# Patient Record
Sex: FEMALE | Race: ASIAN | NOT HISPANIC OR LATINO
[De-identification: names, ages, dates, MRNs, and addresses within clinical notes are randomized per-mention and may not be internally consistent; named-entity substitution may affect disease eponyms.]

---

## 2022-02-12 PROBLEM — Z00.00 ENCOUNTER FOR PREVENTIVE HEALTH EXAMINATION: Status: ACTIVE | Noted: 2022-02-12

## 2022-02-15 ENCOUNTER — NON-APPOINTMENT (OUTPATIENT)
Age: 52
End: 2022-02-15

## 2022-02-16 ENCOUNTER — NON-APPOINTMENT (OUTPATIENT)
Age: 52
End: 2022-02-16

## 2022-02-16 ENCOUNTER — APPOINTMENT (OUTPATIENT)
Dept: GYNECOLOGIC ONCOLOGY | Facility: CLINIC | Age: 52
End: 2022-02-16
Payer: MEDICARE

## 2022-02-16 VITALS
DIASTOLIC BLOOD PRESSURE: 87 MMHG | SYSTOLIC BLOOD PRESSURE: 150 MMHG | BODY MASS INDEX: 25.91 KG/M2 | HEART RATE: 80 BPM | HEIGHT: 60 IN | WEIGHT: 132 LBS

## 2022-02-16 DIAGNOSIS — R19.00 INTRA-ABDOMINAL AND PELVIC SWELLING, MASS AND LUMP, UNSPECIFIED SITE: ICD-10-CM

## 2022-02-16 PROCEDURE — 99204 OFFICE O/P NEW MOD 45 MIN: CPT

## 2022-02-18 ENCOUNTER — NON-APPOINTMENT (OUTPATIENT)
Age: 52
End: 2022-02-18

## 2022-02-18 LAB
CANCER AG125 SERPL-ACNC: 36 U/ML
CANCER AG19-9 SERPL-ACNC: 16 U/ML
CEA SERPL-MCNC: 1.9 NG/ML

## 2022-02-22 ENCOUNTER — NON-APPOINTMENT (OUTPATIENT)
Age: 52
End: 2022-02-22

## 2022-02-22 ENCOUNTER — OUTPATIENT (OUTPATIENT)
Dept: OUTPATIENT SERVICES | Facility: HOSPITAL | Age: 52
LOS: 1 days | End: 2022-02-22

## 2022-02-22 VITALS
DIASTOLIC BLOOD PRESSURE: 84 MMHG | HEART RATE: 85 BPM | OXYGEN SATURATION: 99 % | RESPIRATION RATE: 16 BRPM | HEIGHT: 61 IN | TEMPERATURE: 98 F | WEIGHT: 130.07 LBS | SYSTOLIC BLOOD PRESSURE: 130 MMHG

## 2022-02-22 DIAGNOSIS — R19.00 INTRA-ABDOMINAL AND PELVIC SWELLING, MASS AND LUMP, UNSPECIFIED SITE: ICD-10-CM

## 2022-02-22 DIAGNOSIS — Z98.890 OTHER SPECIFIED POSTPROCEDURAL STATES: Chronic | ICD-10-CM

## 2022-02-22 LAB
A1C WITH ESTIMATED AVERAGE GLUCOSE RESULT: 5.8 % — HIGH (ref 4–5.6)
ANION GAP SERPL CALC-SCNC: 10 MMOL/L — SIGNIFICANT CHANGE UP (ref 7–14)
BLD GP AB SCN SERPL QL: NEGATIVE — SIGNIFICANT CHANGE UP
BUN SERPL-MCNC: 12 MG/DL — SIGNIFICANT CHANGE UP (ref 7–23)
CALCIUM SERPL-MCNC: 8.4 MG/DL — SIGNIFICANT CHANGE UP (ref 8.4–10.5)
CHLORIDE SERPL-SCNC: 106 MMOL/L — SIGNIFICANT CHANGE UP (ref 98–107)
CO2 SERPL-SCNC: 21 MMOL/L — LOW (ref 22–31)
CREAT SERPL-MCNC: 0.67 MG/DL — SIGNIFICANT CHANGE UP (ref 0.5–1.3)
ESTIMATED AVERAGE GLUCOSE: 120 — SIGNIFICANT CHANGE UP
GLUCOSE SERPL-MCNC: 137 MG/DL — HIGH (ref 70–99)
HCG SERPL-ACNC: <5 MIU/ML — SIGNIFICANT CHANGE UP
HCT VFR BLD CALC: 39.3 % — SIGNIFICANT CHANGE UP (ref 34.5–45)
HGB BLD-MCNC: 13.3 G/DL — SIGNIFICANT CHANGE UP (ref 11.5–15.5)
MCHC RBC-ENTMCNC: 31.1 PG — SIGNIFICANT CHANGE UP (ref 27–34)
MCHC RBC-ENTMCNC: 33.8 GM/DL — SIGNIFICANT CHANGE UP (ref 32–36)
MCV RBC AUTO: 92 FL — SIGNIFICANT CHANGE UP (ref 80–100)
NRBC # BLD: 0 /100 WBCS — SIGNIFICANT CHANGE UP
NRBC # FLD: 0 K/UL — SIGNIFICANT CHANGE UP
PLATELET # BLD AUTO: 257 K/UL — SIGNIFICANT CHANGE UP (ref 150–400)
POTASSIUM SERPL-MCNC: 3.8 MMOL/L — SIGNIFICANT CHANGE UP (ref 3.5–5.3)
POTASSIUM SERPL-SCNC: 3.8 MMOL/L — SIGNIFICANT CHANGE UP (ref 3.5–5.3)
RBC # BLD: 4.27 M/UL — SIGNIFICANT CHANGE UP (ref 3.8–5.2)
RBC # FLD: 12.8 % — SIGNIFICANT CHANGE UP (ref 10.3–14.5)
RH IG SCN BLD-IMP: POSITIVE — SIGNIFICANT CHANGE UP
SODIUM SERPL-SCNC: 137 MMOL/L — SIGNIFICANT CHANGE UP (ref 135–145)
WBC # BLD: 4.91 K/UL — SIGNIFICANT CHANGE UP (ref 3.8–10.5)
WBC # FLD AUTO: 4.91 K/UL — SIGNIFICANT CHANGE UP (ref 3.8–10.5)

## 2022-02-22 RX ORDER — SODIUM CHLORIDE 9 MG/ML
1000 INJECTION, SOLUTION INTRAVENOUS
Refills: 0 | Status: DISCONTINUED | OUTPATIENT
Start: 2022-03-01 | End: 2022-03-01

## 2022-02-22 RX ORDER — CHLORHEXIDINE GLUCONATE 213 G/1000ML
1 SOLUTION TOPICAL ONCE
Refills: 0 | Status: COMPLETED | OUTPATIENT
Start: 2022-03-01 | End: 2022-03-01

## 2022-02-22 NOTE — H&P PST ADULT - GENITOURINARY COMMENTS
pelvic bloating and swelling preop dx. intra-abdominal and pelvic swelling and lump unspecified site

## 2022-02-22 NOTE — H&P PST ADULT - NSANTHOSAYNRD_GEN_A_CORE
No. DHARA screening performed.  STOP BANG Legend: 0-2 = LOW Risk; 3-4 = INTERMEDIATE Risk; 5-8 = HIGH Risk

## 2022-02-22 NOTE — H&P PST ADULT - PROBLEM SELECTOR PLAN 1
Pt is scheduled for exploratory laparotomy, resection of pelvic mass, possible total abdominal hysteroscopy, bilateral salpingo oophorectomy on 3/1/22.  Verbal and written pre op instructions reviewed with patient and pt able to verbalize understanding.   Verbal and written instructions given with chlorhexidine wash, pt able to verbalize understanding via teach back method.  Pt instructed to follow surgeon's guidelines regarding COVID testing preop.  Sterile cup given, pt instructed to bring urine sample AM of surgery for UCG and pt able to verbalize understanding.

## 2022-02-22 NOTE — H&P PST ADULT - HISTORY OF PRESENT ILLNESS
52 yo female with preop dx intra-abdominal and pelvic swelling and lump unspecified site presents to pre surgical testing.  Pt with c/o abdominal swelling and bloating, s/p US revealing pelvic mass, further evaluated with CT.  Pt is scheduled for exploratory laparotomy, resetion of pelvic mass, possible total abdominal hysteroscopy, bilateral salpingo oophorectomy.   52 yo female with preop dx intra-abdominal and pelvic swelling and lump unspecified site presents to pre surgical testing.  Pt with c/o abdominal swelling and bloating, s/p US revealing pelvic mass, further evaluated with CT.  Pt is scheduled for exploratory laparotomy, resection of pelvic mass, possible total abdominal hysteroscopy, bilateral salpingo oophorectomy.

## 2022-02-22 NOTE — H&P PST ADULT - NEGATIVE NEUROLOGICAL SYMPTOMS
no transient paralysis/no weakness/no paresthesias/no generalized seizures/no vertigo/no loss of sensation/no difficulty walking

## 2022-02-22 NOTE — H&P PST ADULT - NSICDXFAMHXNEG_GEN_ALL
Child's Well Visit, 1 Week: Care Instructions Your Care Instructions You may wonder \"Am I doing this right? \" Trust your instincts. Cuddling, rocking, and talking to your baby are the right things to do. At this age, your new baby may respond to sounds by blinking, crying, or appearing to be startled. He or she may look at faces and follow an object with his or her eyes. Your baby may be moving his or her arms, legs, and head. Your next checkup is when your baby is 3to 2 weeks old. Follow-up care is a key part of your child's treatment and safety. Be sure to make and go to all appointments, and call your doctor if your child is having problems. It's also a good idea to know your child's test results and keep a list of the medicines your child takes. How can you care for your child at home? Feeding · Feed your baby whenever he or she is hungry. In the first 2 weeks, your baby will breastfeed at least 8 times in a 24-hour period. This means you may need to wake your baby to breastfeed. · If you do not breastfeed, use a formula with iron. (Talk to your doctor if you are using a low-iron formula.) At this age, most babies feed about 1½ to 3 ounces of formula every 3 to 4 hours. · Do not warm bottles in the microwave. You could burn your baby's mouth. Always check the temperature of the formula by placing a few drops on your wrist. 
· Never give your baby honey in the first year of life. Honey can make your baby sick. Breastfeeding tips · Offer the other breast when the first breast feels empty and your baby sucks more slowly, pulls off, or loses interest. Usually your baby will continue breastfeeding, though perhaps for less time than on the first breast. If your baby takes only one breast at a feeding, start the next feeding on the other breast. 
· If your baby is sleepy when it is time to eat, try changing your baby's diaper, undressing your baby and taking your shirt off for skin-to-skin contact, or gently rubbing your fingers up and down your baby's back. · If your baby cannot latch on to your breast, try this: 
? Hold your baby's body facing your body (chest to chest). ? Support your breast with your fingers under your breast and your thumb on top. Keep your fingers and thumb off of the areola. ? Use your nipple to lightly tickle your baby's lower lip. When your baby opens his or her mouth wide, quickly pull your baby onto your breast. 
? Get as much of your breast into your baby's mouth as you can. 
? Call your doctor if you have problems. · By the third day of life, you should notice some breast fullness and milk dripping from the other breast while you nurse. · By the third day of life, your baby should be latching on to the breast well, having at least 3 stools a day, and wetting at least 6 diapers a day. Stools should be yellow and watery, not dark green and sticky. Healthy habits · Stay healthy yourself by eating healthy foods and drinking plenty of fluids, especially water. Rest when your baby is sleeping. · Do not smoke or expose your baby to smoke. Smoking increases the risk of SIDS (crib death), ear infections, asthma, colds, and pneumonia. If you need help quitting, talk to your doctor about stop-smoking programs and medicines. These can increase your chances of quitting for good. · Wash your hands before you hold your baby. Keep your baby away from crowds and sick people. Be sure all visitors are up to date with their vaccinations. · Try to keep the umbilical cord dry until it falls off. · Keep babies younger than 6 months out of the sun. If you cannot avoid the sun, use hats and clothing to protect your child's skin. Safety · Put your baby to sleep on his or her back, not on the side or tummy. This reduces the risk of SIDS. Use a firm, flat mattress. Do not put pillows in the crib. Do not use sleep positioners or crib bumpers. · Put your baby in a car seat for every ride. Place the seat in the middle of the backseat, facing backward. For questions about car seats, call the [employer] at 1-954.119.6539. Parenting · Never shake or spank your baby. This can cause serious injury and even death. · Many women get the \"baby blues\" during the first few days after childbirth. Ask for help with preparing food and other daily tasks. Family and friends are often happy to help a new mother. · If your moodiness or anxiety lasts for more than 2 weeks, or if you feel like life is not worth living, you may have postpartum depression. Talk to your doctor. · Dress your baby with one more layer of clothing than you are wearing, including a hat during the winter. Cold air or wind does not cause ear infections or pneumonia. Illness and fever · Hiccups, sneezing, irregular breathing, sounding congested, and crossing of the eyes are all normal. 
· Call your doctor if your baby has signs of jaundice, such as yellow- or orange-colored skin. · Take your baby's rectal temperature if you think he or she is ill. It is the most accurate. Armpit and ear temperatures are not as reliable at this age. ? A normal rectal temperature is from 97.5°F to 100.3°F. 
? [place] your baby down on his or her stomach. Put some petroleum jelly on the end of the thermometer and gently put the thermometer about ¼ to ½ inch into the rectum. Leave it in for 2 minutes. To read the thermometer, turn it so you can see the display clearly. When should you call for help? Watch closely for changes in your baby's health, and be sure to contact your doctor if: 
  · You are concerned that your baby is not getting enough to eat or is not developing normally.  
  · Your baby seems sick.  
  · Your baby has a fever.  
  · You need more information about how to care for your baby, or you have questions or concerns. Where can you learn more? Go to http://www.gray.com/ Enter Y254 in the search box to learn more about \"Child's Well Visit, 1 Week: Care Instructions. \" Current as of: May 27, 2020               Content Version: 12.6 © 4948-8437 "[employer]orporated. Care instructions adapted under license by [employer] (which disclaims liability or warranty for this information). If you have questions about a medical condition or this instruction, always ask your healthcare professional. [place] [address] any warranty or liability for your use of this information. Always back to sleep and may do tummy time 2-3+ times/day when awake Reviewed that for temps over 100.4 F rectally to call immediately No tylenol until after 3 mo of age cancer/coronary disease/stroke

## 2022-02-28 ENCOUNTER — TRANSCRIPTION ENCOUNTER (OUTPATIENT)
Age: 52
End: 2022-02-28

## 2022-02-28 NOTE — ASU PATIENT PROFILE, ADULT - FALL HARM RISK - UNIVERSAL INTERVENTIONS
Bed in lowest position, wheels locked, appropriate side rails in place/Call bell, personal items and telephone in reach/Instruct patient to call for assistance before getting out of bed or chair/Non-slip footwear when patient is out of bed/[place] to call system/Physically safe environment - no spills, clutter or unnecessary equipment/Purposeful Proactive Rounding/Room/bathroom lighting operational, light cord in reach

## 2022-03-01 ENCOUNTER — INPATIENT (INPATIENT)
Facility: HOSPITAL | Age: 52
LOS: 2 days | Discharge: HOME | End: 2022-03-04
Attending: OBSTETRICS & GYNECOLOGY | Admitting: OBSTETRICS & GYNECOLOGY
Payer: MEDICAID

## 2022-03-01 ENCOUNTER — RESULT REVIEW (OUTPATIENT)
Age: 52
End: 2022-03-01

## 2022-03-01 ENCOUNTER — TRANSCRIPTION ENCOUNTER (OUTPATIENT)
Age: 52
End: 2022-03-01

## 2022-03-01 ENCOUNTER — APPOINTMENT (OUTPATIENT)
Dept: GYNECOLOGIC ONCOLOGY | Facility: HOSPITAL | Age: 52
End: 2022-03-01

## 2022-03-01 VITALS
OXYGEN SATURATION: 99 % | TEMPERATURE: 98 F | DIASTOLIC BLOOD PRESSURE: 87 MMHG | HEIGHT: 61 IN | HEART RATE: 93 BPM | WEIGHT: 125 LBS | RESPIRATION RATE: 16 BRPM | SYSTOLIC BLOOD PRESSURE: 145 MMHG

## 2022-03-01 DIAGNOSIS — R19.00 INTRA-ABDOMINAL AND PELVIC SWELLING, MASS AND LUMP, UNSPECIFIED SITE: ICD-10-CM

## 2022-03-01 DIAGNOSIS — Z98.890 OTHER SPECIFIED POSTPROCEDURAL STATES: Chronic | ICD-10-CM

## 2022-03-01 LAB
GLUCOSE BLDC GLUCOMTR-MCNC: 96 MG/DL — SIGNIFICANT CHANGE UP (ref 70–99)
HCG UR QL: NEGATIVE — SIGNIFICANT CHANGE UP
RH IG SCN BLD-IMP: POSITIVE — SIGNIFICANT CHANGE UP

## 2022-03-01 PROCEDURE — 88305 TISSUE EXAM BY PATHOLOGIST: CPT | Mod: 26

## 2022-03-01 PROCEDURE — 88307 TISSUE EXAM BY PATHOLOGIST: CPT | Mod: 26

## 2022-03-01 PROCEDURE — 49205: CPT | Mod: 62

## 2022-03-01 PROCEDURE — 49205: CPT

## 2022-03-01 PROCEDURE — 88331 PATH CONSLTJ SURG 1 BLK 1SPC: CPT | Mod: 26

## 2022-03-01 PROCEDURE — 50715 RELEASE OF URETER: CPT | Mod: 59

## 2022-03-01 PROCEDURE — 50225 REMOVAL KIDNEY OPEN COMPLEX: CPT | Mod: RT

## 2022-03-01 PROCEDURE — 88291 CYTO/MOLECULAR REPORT: CPT | Mod: 1L

## 2022-03-01 PROCEDURE — 88112 CYTOPATH CELL ENHANCE TECH: CPT | Mod: 26

## 2022-03-01 DEVICE — VISTASEAL FIBRIN HUMAN 10ML: Type: IMPLANTABLE DEVICE | Status: FUNCTIONAL

## 2022-03-01 DEVICE — STAPLER COVIDIEN TRI-STAPLE 60MM TAN RELOAD: Type: IMPLANTABLE DEVICE | Status: FUNCTIONAL

## 2022-03-01 DEVICE — SEPRAFILM 5 X 6": Type: IMPLANTABLE DEVICE | Status: FUNCTIONAL

## 2022-03-01 DEVICE — STAPLER COVIDIEN TRI-STAPLE 45MM PURPLE RELOAD: Type: IMPLANTABLE DEVICE | Status: FUNCTIONAL

## 2022-03-01 RX ORDER — ACETAMINOPHEN 500 MG
1000 TABLET ORAL ONCE
Refills: 0 | Status: COMPLETED | OUTPATIENT
Start: 2022-03-01 | End: 2022-03-01

## 2022-03-01 RX ORDER — SODIUM CHLORIDE 9 MG/ML
1000 INJECTION, SOLUTION INTRAVENOUS
Refills: 0 | Status: DISCONTINUED | OUTPATIENT
Start: 2022-03-01 | End: 2022-03-02

## 2022-03-01 RX ORDER — NALOXONE HYDROCHLORIDE 4 MG/.1ML
0.1 SPRAY NASAL
Refills: 0 | Status: DISCONTINUED | OUTPATIENT
Start: 2022-03-01 | End: 2022-03-02

## 2022-03-01 RX ORDER — SIMETHICONE 80 MG/1
80 TABLET, CHEWABLE ORAL EVERY 6 HOURS
Refills: 0 | Status: DISCONTINUED | OUTPATIENT
Start: 2022-03-01 | End: 2022-03-04

## 2022-03-01 RX ORDER — ONDANSETRON 8 MG/1
4 TABLET, FILM COATED ORAL EVERY 6 HOURS
Refills: 0 | Status: DISCONTINUED | OUTPATIENT
Start: 2022-03-01 | End: 2022-03-04

## 2022-03-01 RX ORDER — FENTANYL CITRATE 50 UG/ML
25 INJECTION INTRAVENOUS
Refills: 0 | Status: DISCONTINUED | OUTPATIENT
Start: 2022-03-01 | End: 2022-03-02

## 2022-03-01 RX ORDER — ACETAMINOPHEN 500 MG
1000 TABLET ORAL ONCE
Refills: 0 | Status: COMPLETED | OUTPATIENT
Start: 2022-03-02 | End: 2022-03-02

## 2022-03-01 RX ORDER — SENNA PLUS 8.6 MG/1
2 TABLET ORAL AT BEDTIME
Refills: 0 | Status: DISCONTINUED | OUTPATIENT
Start: 2022-03-01 | End: 2022-03-04

## 2022-03-01 RX ORDER — ONDANSETRON 8 MG/1
4 TABLET, FILM COATED ORAL ONCE
Refills: 0 | Status: DISCONTINUED | OUTPATIENT
Start: 2022-03-01 | End: 2022-03-02

## 2022-03-01 RX ORDER — HYDROMORPHONE HYDROCHLORIDE 2 MG/ML
30 INJECTION INTRAMUSCULAR; INTRAVENOUS; SUBCUTANEOUS
Refills: 0 | Status: DISCONTINUED | OUTPATIENT
Start: 2022-03-01 | End: 2022-03-02

## 2022-03-01 RX ORDER — HEPARIN SODIUM 5000 [USP'U]/ML
5000 INJECTION INTRAVENOUS; SUBCUTANEOUS EVERY 8 HOURS
Refills: 0 | Status: DISCONTINUED | OUTPATIENT
Start: 2022-03-02 | End: 2022-03-04

## 2022-03-01 RX ORDER — ONDANSETRON 8 MG/1
4 TABLET, FILM COATED ORAL EVERY 6 HOURS
Refills: 0 | Status: DISCONTINUED | OUTPATIENT
Start: 2022-03-01 | End: 2022-03-02

## 2022-03-01 RX ORDER — HYDROMORPHONE HYDROCHLORIDE 2 MG/ML
0.5 INJECTION INTRAMUSCULAR; INTRAVENOUS; SUBCUTANEOUS
Refills: 0 | Status: DISCONTINUED | OUTPATIENT
Start: 2022-03-01 | End: 2022-03-02

## 2022-03-01 RX ORDER — PANTOPRAZOLE SODIUM 20 MG/1
40 TABLET, DELAYED RELEASE ORAL DAILY
Refills: 0 | Status: DISCONTINUED | OUTPATIENT
Start: 2022-03-01 | End: 2022-03-04

## 2022-03-01 RX ORDER — KETOROLAC TROMETHAMINE 30 MG/ML
15 SYRINGE (ML) INJECTION EVERY 6 HOURS
Refills: 0 | Status: DISCONTINUED | OUTPATIENT
Start: 2022-03-01 | End: 2022-03-02

## 2022-03-01 RX ADMIN — CHLORHEXIDINE GLUCONATE 1 APPLICATION(S): 213 SOLUTION TOPICAL at 14:18

## 2022-03-01 RX ADMIN — Medication 15 MILLIGRAM(S): at 23:35

## 2022-03-01 RX ADMIN — HYDROMORPHONE HYDROCHLORIDE 30 MILLILITER(S): 2 INJECTION INTRAMUSCULAR; INTRAVENOUS; SUBCUTANEOUS at 23:49

## 2022-03-01 RX ADMIN — Medication 15 MILLIGRAM(S): at 23:05

## 2022-03-01 RX ADMIN — SODIUM CHLORIDE 80 MILLILITER(S): 9 INJECTION, SOLUTION INTRAVENOUS at 21:31

## 2022-03-01 RX ADMIN — SODIUM CHLORIDE 30 MILLILITER(S): 9 INJECTION, SOLUTION INTRAVENOUS at 14:17

## 2022-03-01 NOTE — DISCHARGE NOTE PROVIDER - PROVIDER TOKENS
PROVIDER:[TOKEN:[[id number]]] PROVIDER:[TOKEN:[[id number]],SCHEDULEDAPPT:[03/14/2022],SCHEDULEDAPPTTIME:[02:30 PM]] PROVIDER:[TOKEN:[[id number]],SCHEDULEDAPPT:[03/14/2022],SCHEDULEDAPPTTIME:[02:30 PM]],PROVIDER:[TOKEN:[[id number]]]

## 2022-03-01 NOTE — BRIEF OPERATIVE NOTE - OPERATION/FINDINGS
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

## 2022-03-01 NOTE — DISCHARGE NOTE PROVIDER - NSDCMRMEDTOKEN_GEN_ALL_CORE_FT
no medications:    apixaban 2.5 mg oral tablet: 1 tab(s) orally every 12 hours MDD:2 tabs  please apply coupon  provider contact #[id number]  no medications:    acetaminophen 325 mg oral tablet: 3 tab(s) orally every 6 hours  apixaban 2.5 mg oral tablet: 1 tab(s) orally every 12 hours MDD:2 tabs  please apply coupon  provider contact #[id number]  naproxen 500 mg oral tablet: 1 tab(s) orally every 12 hours MDD:2 tabs  oxyCODONE 5 mg oral tablet: 1 tab(s) orally every 6 hours - for severe pain MDD:4 tabs

## 2022-03-01 NOTE — DISCHARGE NOTE PROVIDER - CARE PROVIDERS DIRECT ADDRESSES
,[email or web address]

## 2022-03-01 NOTE — BRIEF OPERATIVE NOTE - NSICDXBRIEFPROCEDURE_GEN_ALL_CORE_FT
PROCEDURES:  Exploratory laparotomy 01-Mar-2022 21:13:02  Katy Mejia  Right total nephrectomy 01-Mar-2022 21:14:31  Katy Mejia  Exploration, retroperitoneum 01-Mar-2022 21:17:24  Katy Mejia

## 2022-03-01 NOTE — CHART NOTE - NSCHARTNOTEFT_GEN_A_CORE
OBGYN POST-OP CHECK    S: Patient seen and evaluated at bedside.  Pt sleeping, easily aroused. Patient reports feeling tired and minimal incisional pain. Pt denies N/V, SOB, CP, palpitations, fever/chills. Has not yet tried anything by mouth.  Not OOB yet. Martinez in place.    O:   T(C): 36.1 (03-01-22 @ 21:05), Max: 36.1 (03-01-22 @ 21:05)  HR: 80 (03-01-22 @ 23:00) (68 - 80)  BP: 124/68 (03-01-22 @ 22:45) (124/68 - 149/74)  RR: 15 (03-01-22 @ 23:00) (13 - 20)  SpO2: 96% (03-01-22 @ 23:00) (96% - 100%)  Wt(kg): --  I&O's Summary    01 Mar 2022 07:01  -  01 Mar 2022 23:42  --------------------------------------------------------  IN: 240 mL / OUT: 250 mL / NET: -10 mL        Gen: Resting comfortably in bed, NAD  CV: S1+/S2+, RRR  Lungs: CTA B/L  Abd: soft, appropriately tender, hypoactive bs    Inc: midline vertical incision C/D/I with dermabond prineo over top   Ext: SCD's in place and functional, non-tender b/l, no edema        A/P: 51y Female s/p Ex Lap, Retroperitoneal Dissection, Resection of Pelvic Mass, R nephrectomy.     Neuro: s/p QL blocks. PCA, IV tylenol, toradol for pain control  CV: Hemodynamically stable.  Monitor VS. CBC in AM.   Pulm: Saturating well on room air.  Encourage OOB and incentive spirometer use. Continuous pulse ox while on PCA.  GI: CLD as tolerated. Anti-emetics PRN. Protonix, Senna, Simethicone.  : Martinez to gravity. UOP adequate.  FEN: LR@125cc/hr. BMP in AM.   Heme: DVT prophylaxis w/ SCD's while in bed. Early ambulation. Continue Heparin SQ.   ID: Afebrile  Endo: No active issues   Dispo: Discharge from PACU when criteria met.     Vianey Velasquez, PGY4

## 2022-03-01 NOTE — DISCHARGE NOTE PROVIDER - REASON FOR ADMISSION
scheduled surgery exploratory laparotomy, retroperitoneal dissection,  exploratory laparotomy, retroperitoneal dissection, resection of pelvic mass, right nephrectomy

## 2022-03-01 NOTE — DISCHARGE NOTE PROVIDER - CARE PROVIDER_API CALL
Almaz De Jesus)  Gynecologic Oncology; Obstetrics and Gynecology  [address] [place] [address]  Phone: [phone]  Fax: [phone]  Follow Up Time:    Almaz De Jesus)  Gynecologic Oncology; Obstetrics and Gynecology  [address] [place] [address]  Phone: [phone]  Fax: [phone]  Scheduled Appointment: 03/14/2022 02:30 PM   Almaz De Jesus)  Gynecologic Oncology; Obstetrics and Gynecology  [address] [place] [address]  Phone: [phone]  Fax: [phone]  Scheduled Appointment: 03/14/2022 02:30 PM    Monica Lennon)  Urology  [address] [place] [address]  Phone: [phone]  Fax: [phone]  Follow Up Time:

## 2022-03-01 NOTE — DISCHARGE NOTE PROVIDER - NSDCFUADDINST_GEN_ALL_CORE_FT
Regular activity as tolerated, no heavy lifting for 6 weeks.  Nothing per vagina: no intercourse, tampons or douching for 6 weeks or until cleared by your physician.  Call your provider if you experience fevers (100.4F), chills, nausea, vomiting, persistent swelling of incision, worsening abdominal pain, inability to urinate or worsening vaginal bleeding.  Follow up with your provider in 2 weeks. Regular activity as tolerated, no heavy lifting for 6 weeks.  Nothing per vagina: no intercourse, tampons or douching for 6 weeks or until cleared by your physician.  Call your provider if you experience fevers (100.4F), chills, nausea, vomiting, persistent swelling of incision, worsening abdominal pain, inability to urinate or worsening vaginal bleeding.  Take Tylenol and Naproxen as needed for pain. Take Oxycodone as needed for severe pain. A prescription for Naproxen and Oxycodone has been sent to your pharmacy on file. Follow up with your provider on 3/14/22 at 2:30pm, as scheduled.

## 2022-03-01 NOTE — DISCHARGE NOTE PROVIDER - NSDCCPTREATMENT_GEN_ALL_CORE_FT
PRINCIPAL PROCEDURE  Procedure: Exploratory laparotomy  Findings and Treatment:       SECONDARY PROCEDURE  Procedure: Exploration, retroperitoneum  Findings and Treatment:     Procedure: Right total nephrectomy  Findings and Treatment:

## 2022-03-01 NOTE — DISCHARGE NOTE PROVIDER - HOSPITAL COURSE
Patient underwent an exploratory laparotomy, resection of pelvic mass, left nephrectomy. 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  EBL:   . Hct:   .    On POD#0, pain was well controlled with PCA pump and patient tolerated clears.   On POD#1,   Upon discharge on POD# , the patient was ambulating, voiding spontaneously, tolerating oral intake, pain was well controlled with oral medication, and vital signs were stable. Patient to have close follow up with Dr. De Jesus in the office. Patient underwent an exploratory laparotomy, resection of pelvic mass, left nephrectomy. 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  EBL: 200    Hct: 39.3-> 35.2    On POD#0, pain was well controlled with PCA pump and patient tolerated clears.   On POD#1, patient tolerated regular diet and Martinez was removed. Patient is voiding spontaneously ?????  Upon discharge on POD# , the patient was ambulating, voiding spontaneously, tolerating oral intake, pain was well controlled with oral medication, and vital signs were stable. Patient to have close follow up with Dr. De Jesus in the office. Patient underwent an exploratory laparotomy, resection of retroperitoneal mass and right nephrectomy with Yanely Telles and Saji as intraoperative consults. 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 EBL: 200. Please see operative note for details of procedure.  Hct: 39.3-> 35.2    On POD#0, pain was well controlled with PCA pump and patient tolerated clears.   On POD#1, patient tolerated regular diet and Martinez was removed. Patient was able to void freely. She was transitioned to oral anaglesics. An uptrend in Cr was noted from 0.67->1.14.  On POD#2, patient continued to progress appropriately in postoperative period. Cr was noted to further uptrend to 1.26.   Upon discharge on POD# , the patient was ambulating, voiding spontaneously, tolerating oral intake, pain was well controlled with oral medication, and vital signs were stable. Patient to have close follow up with Dr. De Jesus in the office. Patient underwent an exploratory laparotomy, resection of retroperitoneal mass and right nephrectomy with Yanely Telles and Saji as intraoperative consults. 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 EBL: 200. Please see operative note for details of procedure.  Hct: 39.3-> 35.2    On POD#0, pain was well controlled with PCA pump and patient tolerated clears.   On POD#1, patient tolerated regular diet and Martinez was removed. Patient was able to void freely. She was transitioned to oral anaglesics. An uptrend in Cr was noted from 0.67->1.14.  On POD#2, patient continued to progress appropriately in postoperative period. Cr was noted to further uptrend to 1.26.   On POD#4, Cr improved to 1.12. All AM labs were stable, and patient was asymptomatic.   Upon discharge on POD# , the patient was ambulating, voiding spontaneously, tolerating oral intake, pain was well controlled with oral medication, and vital signs were stable. Patient to have close follow up with Dr. De Jesus in the office. Patient underwent an exploratory laparotomy, resection of retroperitoneal mass and right nephrectomy with Yanely Telles and Saji as intraoperative consults. 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 EBL: 200. Please see operative note for details of procedure.  Hct: 39.3-> 35.2 ->31.4 -> 30.7     On POD#0, pain was well controlled with PCA pump and patient tolerated clears.   On POD#1, patient tolerated regular diet and Martinez was removed. Patient was able to void freely. She was transitioned to oral anaglesics. An uptrend in Cr was noted from 0.67->1.14.  On POD#2, patient continued to progress appropriately in postoperative period. Cr was noted to further uptrend to 1.26.   On POD#4, Cr improved to 1.12. All AM labs were stable, and patient was asymptomatic.   Upon discharge on POD# 4 , the patient was ambulating, voiding spontaneously, tolerating oral intake, pain was well controlled with oral medication, and vital signs were stable. Patient sent home with Apixaban. Patient to have close follow up with Dr. De Jesus and Dr. Lennon of Urology.

## 2022-03-02 PROBLEM — R19.00 INTRA-ABDOMINAL AND PELVIC SWELLING, MASS AND LUMP, UNSPECIFIED SITE: Chronic | Status: ACTIVE | Noted: 2022-02-22

## 2022-03-02 LAB
ANION GAP SERPL CALC-SCNC: 11 MMOL/L — SIGNIFICANT CHANGE UP (ref 7–14)
BASOPHILS # BLD AUTO: 0.01 K/UL — SIGNIFICANT CHANGE UP (ref 0–0.2)
BASOPHILS NFR BLD AUTO: 0.1 % — SIGNIFICANT CHANGE UP (ref 0–2)
BUN SERPL-MCNC: 15 MG/DL — SIGNIFICANT CHANGE UP (ref 7–23)
CALCIUM SERPL-MCNC: 7.8 MG/DL — LOW (ref 8.4–10.5)
CHLORIDE SERPL-SCNC: 101 MMOL/L — SIGNIFICANT CHANGE UP (ref 98–107)
CO2 SERPL-SCNC: 21 MMOL/L — LOW (ref 22–31)
CREAT SERPL-MCNC: 1.14 MG/DL — SIGNIFICANT CHANGE UP (ref 0.5–1.3)
EGFR: 59 ML/MIN/1.73M2 — LOW
EOSINOPHIL # BLD AUTO: 0 K/UL — SIGNIFICANT CHANGE UP (ref 0–0.5)
EOSINOPHIL NFR BLD AUTO: 0 % — SIGNIFICANT CHANGE UP (ref 0–6)
GLUCOSE SERPL-MCNC: 194 MG/DL — HIGH (ref 70–99)
HCT VFR BLD CALC: 35.2 % — SIGNIFICANT CHANGE UP (ref 34.5–45)
HGB BLD-MCNC: 11.5 G/DL — SIGNIFICANT CHANGE UP (ref 11.5–15.5)
IANC: 7.8 K/UL — SIGNIFICANT CHANGE UP (ref 1.5–8.5)
IMM GRANULOCYTES NFR BLD AUTO: 0.5 % — SIGNIFICANT CHANGE UP (ref 0–1.5)
LYMPHOCYTES # BLD AUTO: 0.61 K/UL — LOW (ref 1–3.3)
LYMPHOCYTES # BLD AUTO: 6.9 % — LOW (ref 13–44)
MAGNESIUM SERPL-MCNC: 1.6 MG/DL — SIGNIFICANT CHANGE UP (ref 1.6–2.6)
MCHC RBC-ENTMCNC: 30.5 PG — SIGNIFICANT CHANGE UP (ref 27–34)
MCHC RBC-ENTMCNC: 32.7 GM/DL — SIGNIFICANT CHANGE UP (ref 32–36)
MCV RBC AUTO: 93.4 FL — SIGNIFICANT CHANGE UP (ref 80–100)
MONOCYTES # BLD AUTO: 0.35 K/UL — SIGNIFICANT CHANGE UP (ref 0–0.9)
MONOCYTES NFR BLD AUTO: 4 % — SIGNIFICANT CHANGE UP (ref 2–14)
NEUTROPHILS # BLD AUTO: 7.8 K/UL — HIGH (ref 1.8–7.4)
NEUTROPHILS NFR BLD AUTO: 88.5 % — HIGH (ref 43–77)
NRBC # BLD: 0 /100 WBCS — SIGNIFICANT CHANGE UP
NRBC # FLD: 0 K/UL — SIGNIFICANT CHANGE UP
PHOSPHATE SERPL-MCNC: 4.5 MG/DL — SIGNIFICANT CHANGE UP (ref 2.5–4.5)
PLATELET # BLD AUTO: 224 K/UL — SIGNIFICANT CHANGE UP (ref 150–400)
POTASSIUM SERPL-MCNC: 3.6 MMOL/L — SIGNIFICANT CHANGE UP (ref 3.5–5.3)
POTASSIUM SERPL-SCNC: 3.6 MMOL/L — SIGNIFICANT CHANGE UP (ref 3.5–5.3)
RBC # BLD: 3.77 M/UL — LOW (ref 3.8–5.2)
RBC # FLD: 12.7 % — SIGNIFICANT CHANGE UP (ref 10.3–14.5)
SODIUM SERPL-SCNC: 133 MMOL/L — LOW (ref 135–145)
WBC # BLD: 8.81 K/UL — SIGNIFICANT CHANGE UP (ref 3.8–10.5)
WBC # FLD AUTO: 8.81 K/UL — SIGNIFICANT CHANGE UP (ref 3.8–10.5)

## 2022-03-02 RX ORDER — MAGNESIUM SULFATE 500 MG/ML
2 VIAL (ML) INJECTION ONCE
Refills: 0 | Status: COMPLETED | OUTPATIENT
Start: 2022-03-02 | End: 2022-03-02

## 2022-03-02 RX ORDER — ACETAMINOPHEN 500 MG
975 TABLET ORAL EVERY 6 HOURS
Refills: 0 | Status: DISCONTINUED | OUTPATIENT
Start: 2022-03-02 | End: 2022-03-04

## 2022-03-02 RX ORDER — POTASSIUM CHLORIDE 20 MEQ
10 PACKET (EA) ORAL
Refills: 0 | Status: COMPLETED | OUTPATIENT
Start: 2022-03-02 | End: 2022-03-02

## 2022-03-02 RX ORDER — SODIUM CHLORIDE 9 MG/ML
3 INJECTION INTRAMUSCULAR; INTRAVENOUS; SUBCUTANEOUS EVERY 8 HOURS
Refills: 0 | Status: DISCONTINUED | OUTPATIENT
Start: 2022-03-02 | End: 2022-03-04

## 2022-03-02 RX ORDER — MAGNESIUM OXIDE 400 MG ORAL TABLET 241.3 MG
400 TABLET ORAL DAILY
Refills: 0 | Status: DISCONTINUED | OUTPATIENT
Start: 2022-03-02 | End: 2022-03-02

## 2022-03-02 RX ORDER — IBUPROFEN 200 MG
600 TABLET ORAL EVERY 6 HOURS
Refills: 0 | Status: DISCONTINUED | OUTPATIENT
Start: 2022-03-02 | End: 2022-03-04

## 2022-03-02 RX ORDER — OXYCODONE HYDROCHLORIDE 5 MG/1
5 TABLET ORAL
Refills: 0 | Status: DISCONTINUED | OUTPATIENT
Start: 2022-03-02 | End: 2022-03-04

## 2022-03-02 RX ORDER — APIXABAN 2.5 MG/1
1 TABLET, FILM COATED ORAL
Qty: 56 | Refills: 0
Start: 2022-03-02 | End: 2022-03-29

## 2022-03-02 RX ADMIN — SIMETHICONE 80 MILLIGRAM(S): 80 TABLET, CHEWABLE ORAL at 23:21

## 2022-03-02 RX ADMIN — SIMETHICONE 80 MILLIGRAM(S): 80 TABLET, CHEWABLE ORAL at 06:01

## 2022-03-02 RX ADMIN — SIMETHICONE 80 MILLIGRAM(S): 80 TABLET, CHEWABLE ORAL at 17:18

## 2022-03-02 RX ADMIN — HYDROMORPHONE HYDROCHLORIDE 30 MILLILITER(S): 2 INJECTION INTRAMUSCULAR; INTRAVENOUS; SUBCUTANEOUS at 01:59

## 2022-03-02 RX ADMIN — Medication 975 MILLIGRAM(S): at 23:03

## 2022-03-02 RX ADMIN — Medication 100 MILLIEQUIVALENT(S): at 14:36

## 2022-03-02 RX ADMIN — SODIUM CHLORIDE 80 MILLILITER(S): 9 INJECTION, SOLUTION INTRAVENOUS at 08:13

## 2022-03-02 RX ADMIN — HYDROMORPHONE HYDROCHLORIDE 30 MILLILITER(S): 2 INJECTION INTRAMUSCULAR; INTRAVENOUS; SUBCUTANEOUS at 07:21

## 2022-03-02 RX ADMIN — Medication 25 GRAM(S): at 08:13

## 2022-03-02 RX ADMIN — Medication 600 MILLIGRAM(S): at 23:21

## 2022-03-02 RX ADMIN — HEPARIN SODIUM 5000 UNIT(S): 5000 INJECTION INTRAVENOUS; SUBCUTANEOUS at 08:13

## 2022-03-02 RX ADMIN — SIMETHICONE 80 MILLIGRAM(S): 80 TABLET, CHEWABLE ORAL at 12:09

## 2022-03-02 RX ADMIN — HEPARIN SODIUM 5000 UNIT(S): 5000 INJECTION INTRAVENOUS; SUBCUTANEOUS at 01:21

## 2022-03-02 RX ADMIN — Medication 100 MILLIEQUIVALENT(S): at 12:40

## 2022-03-02 RX ADMIN — HEPARIN SODIUM 5000 UNIT(S): 5000 INJECTION INTRAVENOUS; SUBCUTANEOUS at 17:18

## 2022-03-02 RX ADMIN — Medication 600 MILLIGRAM(S): at 17:35

## 2022-03-02 RX ADMIN — Medication 400 MILLIGRAM(S): at 08:13

## 2022-03-02 RX ADMIN — Medication 1000 MILLIGRAM(S): at 03:23

## 2022-03-02 RX ADMIN — SODIUM CHLORIDE 80 MILLILITER(S): 9 INJECTION, SOLUTION INTRAVENOUS at 01:59

## 2022-03-02 RX ADMIN — Medication 15 MILLIGRAM(S): at 07:05

## 2022-03-02 RX ADMIN — SODIUM CHLORIDE 3 MILLILITER(S): 9 INJECTION INTRAMUSCULAR; INTRAVENOUS; SUBCUTANEOUS at 22:33

## 2022-03-02 RX ADMIN — Medication 15 MILLIGRAM(S): at 12:09

## 2022-03-02 RX ADMIN — Medication 15 MILLIGRAM(S): at 06:02

## 2022-03-02 RX ADMIN — Medication 400 MILLIGRAM(S): at 02:21

## 2022-03-02 RX ADMIN — Medication 400 MILLIGRAM(S): at 15:29

## 2022-03-02 RX ADMIN — PANTOPRAZOLE SODIUM 40 MILLIGRAM(S): 20 TABLET, DELAYED RELEASE ORAL at 12:09

## 2022-03-02 NOTE — PROGRESS NOTE ADULT - SUBJECTIVE AND OBJECTIVE BOX
ANESTHESIA POSTOP CHECK    51y Female POSTOP DAY 1 S/P ex lap/ R total nephrectomy    Vital Signs Last 24 Hrs  T(C): 36.7 (02 Mar 2022 09:59), Max: 36.9 (01 Mar 2022 13:44)  T(F): 98 (02 Mar 2022 09:59), Max: 98.4 (01 Mar 2022 13:44)  HR: 71 (02 Mar 2022 09:59) (68 - 93)  BP: 102/53 (02 Mar 2022 09:59) (102/53 - 149/74)  BP(mean): 77 (02 Mar 2022 00:15) (77 - 89)  RR: 16 (02 Mar 2022 09:59) (13 - 20)  SpO2: 99% (02 Mar 2022 09:59) (95% - 100%)  I&O's Summary    01 Mar 2022 07:01  -  02 Mar 2022 07:00  --------------------------------------------------------  IN: 900 mL / OUT: 530 mL / NET: 370 mL    02 Mar 2022 07:01  -  02 Mar 2022 11:25  --------------------------------------------------------  IN: 0 mL / OUT: 500 mL / NET: -500 mL        [X ] NO APPARENT ANESTHESIA COMPLICATIONS      Comments:

## 2022-03-02 NOTE — PATIENT PROFILE ADULT - FALL HARM RISK - UNIVERSAL INTERVENTIONS
Bed in lowest position, wheels locked, appropriate side rails in place/Call bell, personal items and telephone in reach/Instruct patient to call for assistance before getting out of bed or chair/Non-slip footwear when patient is out of bed/[place] to call system/Physically safe environment - no spills, clutter or unnecessary equipment/Purposeful Proactive Rounding/Room/bathroom lighting operational, light cord in reach

## 2022-03-02 NOTE — PROGRESS NOTE ADULT - SUBJECTIVE AND OBJECTIVE BOX
Gyn ONC Progress Note     POD #1    Subjective:   Pt seen and examined at bedside. No events overnight. Pain well controlled. Patient not yet ambulating or passing flatus. Tolerating CLD. Pt denies fever, chills, chest pain, SOB, nausea, vomiting, lightheadedness, dizziness.    Martinez in place draining clear yellow urine.    Objective:  T(F): 97.8 (03-02-22 @ 01:50), Max: 98.4 (03-01-22 @ 13:44)  HR: 69 (03-02-22 @ 01:50) (68 - 93)  BP: 114/56 (03-02-22 @ 01:50) (114/56 - 149/74)  RR: 16 (03-02-22 @ 01:50) (13 - 20)  SpO2: 98% (03-02-22 @ 01:50) (95% - 100%)  Wt(kg): --  I&O's Summary    01 Mar 2022 07:01  -  02 Mar 2022 05:22  --------------------------------------------------------  IN: 740 mL / OUT: 380 mL / NET: 360 mL      CAPILLARY BLOOD GLUCOSE      POCT Blood Glucose.: 96 mg/dL (01 Mar 2022 13:28)      MEDICATIONS  (STANDING):  acetaminophen   IVPB .. 1000 milliGRAM(s) IV Intermittent once  acetaminophen   IVPB .. 1000 milliGRAM(s) IV Intermittent once  heparin   Injectable 5000 Unit(s) SubCutaneous every 8 hours  HYDROmorphone PCA (1 mG/mL) 30 milliLiter(s) PCA Continuous PCA Continuous  ketorolac   Injectable 15 milliGRAM(s) IV Push every 6 hours  lactated ringers. 1000 milliLiter(s) (80 mL/Hr) IV Continuous <Continuous>  pantoprazole  Injectable 40 milliGRAM(s) IV Push daily  simethicone 80 milliGRAM(s) Chew every 6 hours    MEDICATIONS  (PRN):  naloxone Injectable 0.1 milliGRAM(s) IV Push every 3 minutes PRN For ANY of the following changes in patient status:  A. RR LESS THAN 10 breaths per minute, B. Oxygen saturation LESS THAN 90%, C. Sedation score of 6  ondansetron Injectable 4 milliGRAM(s) IV Push every 6 hours PRN Nausea  ondansetron Injectable 4 milliGRAM(s) IV Push every 6 hours PRN Nausea and/or Vomiting  senna 2 Tablet(s) Oral at bedtime PRN Constipation      Physical Exam:  Constitutional: NAD, A+O x3  CV: RRR  Lungs: clear to auscultation bilaterally  Abdomen: soft, nondistended, no guarding, no rebound, +bowel sounds  Incision: midline vertical incision clean, dry, intact w/ dermabond prineo dressing  Extremities: no lower extremity edema or calf tenderness bilaterally; venodynes in place Gyn ONC Progress Note     POD #1    Subjective:   Pt seen and examined at bedside. No events overnight. Pain well controlled. Patient not yet ambulating or passing flatus. Has not yet attempted CLD. Pt denies fever, chills, chest pain, SOB, nausea, vomiting, lightheadedness, dizziness.    Martinez in place draining clear yellow urine.    Objective:  T(F): 97.8 (03-02-22 @ 01:50), Max: 98.4 (03-01-22 @ 13:44)  HR: 69 (03-02-22 @ 01:50) (68 - 93)  BP: 114/56 (03-02-22 @ 01:50) (114/56 - 149/74)  RR: 16 (03-02-22 @ 01:50) (13 - 20)  SpO2: 98% (03-02-22 @ 01:50) (95% - 100%)  Wt(kg): --  I&O's Summary    01 Mar 2022 07:01  -  02 Mar 2022 05:22  --------------------------------------------------------  IN: 740 mL / OUT: 380 mL / NET: 360 mL      CAPILLARY BLOOD GLUCOSE      POCT Blood Glucose.: 96 mg/dL (01 Mar 2022 13:28)      MEDICATIONS  (STANDING):  acetaminophen   IVPB .. 1000 milliGRAM(s) IV Intermittent once  acetaminophen   IVPB .. 1000 milliGRAM(s) IV Intermittent once  heparin   Injectable 5000 Unit(s) SubCutaneous every 8 hours  HYDROmorphone PCA (1 mG/mL) 30 milliLiter(s) PCA Continuous PCA Continuous  ketorolac   Injectable 15 milliGRAM(s) IV Push every 6 hours  lactated ringers. 1000 milliLiter(s) (80 mL/Hr) IV Continuous <Continuous>  pantoprazole  Injectable 40 milliGRAM(s) IV Push daily  simethicone 80 milliGRAM(s) Chew every 6 hours    MEDICATIONS  (PRN):  naloxone Injectable 0.1 milliGRAM(s) IV Push every 3 minutes PRN For ANY of the following changes in patient status:  A. RR LESS THAN 10 breaths per minute, B. Oxygen saturation LESS THAN 90%, C. Sedation score of 6  ondansetron Injectable 4 milliGRAM(s) IV Push every 6 hours PRN Nausea  ondansetron Injectable 4 milliGRAM(s) IV Push every 6 hours PRN Nausea and/or Vomiting  senna 2 Tablet(s) Oral at bedtime PRN Constipation      Physical Exam:  Constitutional: NAD, A+O x3  CV: RRR  Lungs: clear to auscultation bilaterally  Abdomen: soft, nondistended, no guarding, no rebound, +bowel sounds  Incision: midline vertical incision clean, dry, intact w/ dermabond prineo dressing, abdominal binder in place  Extremities: no lower extremity edema or calf tenderness bilaterally; venodynes in place Gyn ONC Progress Note     POD #1     ID: [id number]    Subjective:   Pt seen and examined at bedside. No events overnight. Pain well controlled. Patient not yet ambulating or passing flatus. Has not yet attempted CLD. Pt denies fever, chills, chest pain, SOB, nausea, vomiting, lightheadedness, dizziness.    Martinez in place draining clear yellow urine.    Objective:  T(F): 97.8 (03-02-22 @ 01:50), Max: 98.4 (03-01-22 @ 13:44)  HR: 69 (03-02-22 @ 01:50) (68 - 93)  BP: 114/56 (03-02-22 @ 01:50) (114/56 - 149/74)  RR: 16 (03-02-22 @ 01:50) (13 - 20)  SpO2: 98% (03-02-22 @ 01:50) (95% - 100%)  Wt(kg): --  I&O's Summary    01 Mar 2022 07:01  -  02 Mar 2022 05:22  --------------------------------------------------------  IN: 740 mL / OUT: 380 mL / NET: 360 mL      CAPILLARY BLOOD GLUCOSE      POCT Blood Glucose.: 96 mg/dL (01 Mar 2022 13:28)      MEDICATIONS  (STANDING):  acetaminophen   IVPB .. 1000 milliGRAM(s) IV Intermittent once  acetaminophen   IVPB .. 1000 milliGRAM(s) IV Intermittent once  heparin   Injectable 5000 Unit(s) SubCutaneous every 8 hours  HYDROmorphone PCA (1 mG/mL) 30 milliLiter(s) PCA Continuous PCA Continuous  ketorolac   Injectable 15 milliGRAM(s) IV Push every 6 hours  lactated ringers. 1000 milliLiter(s) (80 mL/Hr) IV Continuous <Continuous>  pantoprazole  Injectable 40 milliGRAM(s) IV Push daily  simethicone 80 milliGRAM(s) Chew every 6 hours    MEDICATIONS  (PRN):  naloxone Injectable 0.1 milliGRAM(s) IV Push every 3 minutes PRN For ANY of the following changes in patient status:  A. RR LESS THAN 10 breaths per minute, B. Oxygen saturation LESS THAN 90%, C. Sedation score of 6  ondansetron Injectable 4 milliGRAM(s) IV Push every 6 hours PRN Nausea  ondansetron Injectable 4 milliGRAM(s) IV Push every 6 hours PRN Nausea and/or Vomiting  senna 2 Tablet(s) Oral at bedtime PRN Constipation      Physical Exam:  Constitutional: NAD, A+O x3  CV: RRR  Lungs: clear to auscultation bilaterally  Abdomen: soft, nondistended, no guarding, no rebound, +bowel sounds  Incision: midline vertical incision clean, dry, intact w/ dermabond prineo dressing, abdominal binder in place  Extremities: no lower extremity edema or calf tenderness bilaterally; venodynes in place Gyn ONC Progress Note     POD #1     ID: [id number]    Subjective:   Pt seen and examined at bedside. No events overnight. Pain well controlled. Patient not yet ambulating or passing flatus. Has not yet attempted CLD. Pt denies fever, chills, chest pain, SOB, nausea, vomiting, lightheadedness, dizziness.   Martinez in place.    Objective:  T(F): 97.8 (03-02-22 @ 01:50), Max: 98.4 (03-01-22 @ 13:44)  HR: 69 (03-02-22 @ 01:50) (68 - 93)  BP: 114/56 (03-02-22 @ 01:50) (114/56 - 149/74)  RR: 16 (03-02-22 @ 01:50) (13 - 20)  SpO2: 98% (03-02-22 @ 01:50) (95% - 100%)  Wt(kg): --  I&O's Summary    01 Mar 2022 07:01  -  02 Mar 2022 05:22  --------------------------------------------------------  IN: 740 mL / OUT: 380 mL / NET: 360 mL      CAPILLARY BLOOD GLUCOSE      POCT Blood Glucose.: 96 mg/dL (01 Mar 2022 13:28)      MEDICATIONS  (STANDING):  acetaminophen   IVPB .. 1000 milliGRAM(s) IV Intermittent once  acetaminophen   IVPB .. 1000 milliGRAM(s) IV Intermittent once  heparin   Injectable 5000 Unit(s) SubCutaneous every 8 hours  HYDROmorphone PCA (1 mG/mL) 30 milliLiter(s) PCA Continuous PCA Continuous  ketorolac   Injectable 15 milliGRAM(s) IV Push every 6 hours  lactated ringers. 1000 milliLiter(s) (80 mL/Hr) IV Continuous <Continuous>  pantoprazole  Injectable 40 milliGRAM(s) IV Push daily  simethicone 80 milliGRAM(s) Chew every 6 hours    MEDICATIONS  (PRN):  naloxone Injectable 0.1 milliGRAM(s) IV Push every 3 minutes PRN For ANY of the following changes in patient status:  A. RR LESS THAN 10 breaths per minute, B. Oxygen saturation LESS THAN 90%, C. Sedation score of 6  ondansetron Injectable 4 milliGRAM(s) IV Push every 6 hours PRN Nausea  ondansetron Injectable 4 milliGRAM(s) IV Push every 6 hours PRN Nausea and/or Vomiting  senna 2 Tablet(s) Oral at bedtime PRN Constipation      Physical Exam:  Constitutional: NAD, A+O x3  CV: RRR  Lungs: clear to auscultation bilaterally  Abdomen: soft, nondistended, no guarding, no rebound, +bowel sounds  Incision: midline vertical incision clean, dry, intact w/ dermabond prineo dressing, abdominal binder in place  : +Martinez, clear yellow urine  Extremities: no lower extremity edema or calf tenderness bilaterally; venodynes in place

## 2022-03-02 NOTE — PROGRESS NOTE ADULT - SUBJECTIVE AND OBJECTIVE BOX
Anesthesia Pain Management Service- Attending Addendum    SUBJECTIVE: Patient's pain control adequate    Therapy:	  [ X] IV PCA	   [ ] Epidural           [ ] s/p Spinal Opoid              [ ] Postpartum infusion	  [ ] Patient controlled regional anesthesia (PCRA)    [ ] prn Analgesics    Allergies    No Known Allergies    Intolerances      MEDICATIONS  (STANDING):  acetaminophen   IVPB .. 1000 milliGRAM(s) IV Intermittent once  heparin   Injectable 5000 Unit(s) SubCutaneous every 8 hours  ketorolac   Injectable 15 milliGRAM(s) IV Push every 6 hours  lactated ringers. 1000 milliLiter(s) (80 mL/Hr) IV Continuous <Continuous>  pantoprazole  Injectable 40 milliGRAM(s) IV Push daily  potassium chloride  10 mEq/100 mL IVPB 10 milliEquivalent(s) IV Intermittent every 1 hour  simethicone 80 milliGRAM(s) Chew every 6 hours    MEDICATIONS  (PRN):  ondansetron Injectable 4 milliGRAM(s) IV Push every 6 hours PRN Nausea and/or Vomiting  oxyCODONE    IR 5 milliGRAM(s) Oral every 3 hours PRN Moderate & Severe Pain (4 - 10)  senna 2 Tablet(s) Oral at bedtime PRN Constipation      OBJECTIVE:   [X] No new signs     [ ] Other:    Side Effects:  [X ] None			[ ] Other:      ASSESSMENT/PLAN  -Discontinue current therapy    [ ] Therapy changed to:    [ ] IV PCA       [ ] Epidural     [ X] prn Analgesics     Comments: Pain management per primary team, APS to sign off

## 2022-03-02 NOTE — PROGRESS NOTE ADULT - ASSESSMENT
Assessment/Plan: 51y female POD#1, s/p ex-lap retroperitoneal dissection, excision of pelvic mass, R nephrectomy. Patient stable and progressing well postoperatively.    CV: Hemodynamically stable, f/u AM CBC.   Pulm: Saturating well on RA. Increase incentive spirometry.  GI: Advance diet to regular as tolerated  : LR@125cc/hr. Martinez in place. Adequate UOP. f/u AM BMP. Replete lytes PRN.  Heme: Continue HSQ/venodynes for DVT ppx. Increase OOB.    Neuro: Transition to oral meds for pain control.    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 Patient stable and progressing well postoperatively.    CV: Hemodynamically stable, AM CBC stable 11.5/35.2.  Pulm: Saturating well on RA. Increase incentive spirometry.  GI: Advance diet to regular as tolerated  : LR@125cc/hr. Martinez in place. Adequate UOP. Cr 1.14, continue to monitor. Replete lytes PRN.  Heme: Continue HSQ/venodynes for DVT ppx. Increase OOB.    Neuro: Transition to oral meds for pain control once tolerating PO intake.    Brenda Barroso, PGY2

## 2022-03-02 NOTE — PATIENT PROFILE ADULT - PATIENT/CAREGIVER ACCEPTED INTERPRETER SERVICES
[Dear  ___] : Dear  [unfilled], [Consult Letter:] : I had the pleasure of evaluating your patient, [unfilled]. yes

## 2022-03-02 NOTE — PROGRESS NOTE ADULT - SUBJECTIVE AND OBJECTIVE BOX
Anesthesia Pain Management Service    SUBJECTIVE: Patient is doing well with IV PCA and no significant problems reported.    Pain Scale Score	At rest: ___ 	With Activity: ___ 	[X ] Refer to charted pain scores    THERAPY:    [ ] IV PCA Morphine		[ ] 5 mg/mL	[ ] 1 mg/mL  [X ] IV PCA Hydromorphone	[ ] 5 mg/mL	[X ] 1 mg/mL  [ ] IV PCA Fentanyl		[ ] 50 micrograms/mL    Demand dose __0.2_ lockout __6_ (minutes) Continuous Rate _0__ Total: _.2__   mg used (in past 24 hrs)      MEDICATIONS  (STANDING):  acetaminophen   IVPB .. 1000 milliGRAM(s) IV Intermittent once  heparin   Injectable 5000 Unit(s) SubCutaneous every 8 hours  ketorolac   Injectable 15 milliGRAM(s) IV Push every 6 hours  lactated ringers. 1000 milliLiter(s) (80 mL/Hr) IV Continuous <Continuous>  pantoprazole  Injectable 40 milliGRAM(s) IV Push daily  potassium chloride  10 mEq/100 mL IVPB 10 milliEquivalent(s) IV Intermittent every 1 hour  simethicone 80 milliGRAM(s) Chew every 6 hours    MEDICATIONS  (PRN):  ondansetron Injectable 4 milliGRAM(s) IV Push every 6 hours PRN Nausea and/or Vomiting  oxyCODONE    IR 5 milliGRAM(s) Oral every 3 hours PRN Moderate & Severe Pain (4 - 10)  senna 2 Tablet(s) Oral at bedtime PRN Constipation      OBJECTIVE: sitting in chair in NAD    Sedation Score:	[ X] Alert	[ ] Drowsy 	[ ] Arousable	[ ] Asleep	[ ] Unresponsive    Side Effects:	[X ] None	[ ] Nausea	[ ] Vomiting	[ ] Pruritus  		[ ] Other:    Vital Signs Last 24 Hrs  T(C): 36.7 (02 Mar 2022 09:59), Max: 36.9 (01 Mar 2022 13:44)  T(F): 98 (02 Mar 2022 09:59), Max: 98.4 (01 Mar 2022 13:44)  HR: 71 (02 Mar 2022 09:59) (68 - 93)  BP: 102/53 (02 Mar 2022 09:59) (102/53 - 149/74)  BP(mean): 77 (02 Mar 2022 00:15) (77 - 89)  RR: 16 (02 Mar 2022 09:59) (13 - 20)  SpO2: 99% (02 Mar 2022 09:59) (95% - 100%)    ASSESSMENT/ PLAN    Therapy to  be:	[ ] Continue   [ X] Discontinued   [X ] Change to prn Analgesics    Documentation and Verification of current medications:   [X] Done	[ ] Not done, not elligible    Comments: PRN Oral/IV opioids and/or Adjuvant non-opioid medication to be ordered at this point.    Progress Note written now but Patient was seen earlier.

## 2022-03-02 NOTE — CHART NOTE - NSCHARTNOTEFT_GEN_A_CORE
: Domitila #[id number]  Apixaban picked up from [facility] and given to patient at bedside. Medication instructions reviewed. There is no copayment.  Aletha Cleaning PA-C  #[id number]

## 2022-03-02 NOTE — PATIENT PROFILE ADULT - LANGUAGE ASSISTANCE NEEDED
Wing Tirado NP- Cantonese/English speaking, no  required./Yes-Patient/Caregiver accepts free interpretation services...

## 2022-03-03 LAB
ANION GAP SERPL CALC-SCNC: 6 MMOL/L — LOW (ref 7–14)
BASOPHILS # BLD AUTO: 0.02 K/UL — SIGNIFICANT CHANGE UP (ref 0–0.2)
BASOPHILS NFR BLD AUTO: 0.2 % — SIGNIFICANT CHANGE UP (ref 0–2)
BUN SERPL-MCNC: 14 MG/DL — SIGNIFICANT CHANGE UP (ref 7–23)
CALCIUM SERPL-MCNC: 7.6 MG/DL — LOW (ref 8.4–10.5)
CHLORIDE SERPL-SCNC: 110 MMOL/L — HIGH (ref 98–107)
CO2 SERPL-SCNC: 23 MMOL/L — SIGNIFICANT CHANGE UP (ref 22–31)
CREAT SERPL-MCNC: 1.26 MG/DL — SIGNIFICANT CHANGE UP (ref 0.5–1.3)
EGFR: 52 ML/MIN/1.73M2 — LOW
EOSINOPHIL # BLD AUTO: 0.05 K/UL — SIGNIFICANT CHANGE UP (ref 0–0.5)
EOSINOPHIL NFR BLD AUTO: 0.5 % — SIGNIFICANT CHANGE UP (ref 0–6)
GLUCOSE SERPL-MCNC: 129 MG/DL — HIGH (ref 70–99)
HCT VFR BLD CALC: 31.4 % — LOW (ref 34.5–45)
HGB BLD-MCNC: 10.5 G/DL — LOW (ref 11.5–15.5)
IANC: 7.27 K/UL — SIGNIFICANT CHANGE UP (ref 1.5–8.5)
IMM GRANULOCYTES NFR BLD AUTO: 0.3 % — SIGNIFICANT CHANGE UP (ref 0–1.5)
LYMPHOCYTES # BLD AUTO: 1.58 K/UL — SIGNIFICANT CHANGE UP (ref 1–3.3)
LYMPHOCYTES # BLD AUTO: 16.6 % — SIGNIFICANT CHANGE UP (ref 13–44)
MAGNESIUM SERPL-MCNC: 2.2 MG/DL — SIGNIFICANT CHANGE UP (ref 1.6–2.6)
MCHC RBC-ENTMCNC: 31.3 PG — SIGNIFICANT CHANGE UP (ref 27–34)
MCHC RBC-ENTMCNC: 33.4 GM/DL — SIGNIFICANT CHANGE UP (ref 32–36)
MCV RBC AUTO: 93.5 FL — SIGNIFICANT CHANGE UP (ref 80–100)
MONOCYTES # BLD AUTO: 0.57 K/UL — SIGNIFICANT CHANGE UP (ref 0–0.9)
MONOCYTES NFR BLD AUTO: 6 % — SIGNIFICANT CHANGE UP (ref 2–14)
NEUTROPHILS # BLD AUTO: 7.27 K/UL — SIGNIFICANT CHANGE UP (ref 1.8–7.4)
NEUTROPHILS NFR BLD AUTO: 76.4 % — SIGNIFICANT CHANGE UP (ref 43–77)
NRBC # BLD: 0 /100 WBCS — SIGNIFICANT CHANGE UP
NRBC # FLD: 0 K/UL — SIGNIFICANT CHANGE UP
PHOSPHATE SERPL-MCNC: 2.4 MG/DL — LOW (ref 2.5–4.5)
PLATELET # BLD AUTO: 223 K/UL — SIGNIFICANT CHANGE UP (ref 150–400)
POTASSIUM SERPL-MCNC: 4.2 MMOL/L — SIGNIFICANT CHANGE UP (ref 3.5–5.3)
POTASSIUM SERPL-SCNC: 4.2 MMOL/L — SIGNIFICANT CHANGE UP (ref 3.5–5.3)
RBC # BLD: 3.36 M/UL — LOW (ref 3.8–5.2)
RBC # FLD: 13.2 % — SIGNIFICANT CHANGE UP (ref 10.3–14.5)
SODIUM SERPL-SCNC: 139 MMOL/L — SIGNIFICANT CHANGE UP (ref 135–145)
WBC # BLD: 9.52 K/UL — SIGNIFICANT CHANGE UP (ref 3.8–10.5)
WBC # FLD AUTO: 9.52 K/UL — SIGNIFICANT CHANGE UP (ref 3.8–10.5)

## 2022-03-03 RX ORDER — SODIUM,POTASSIUM PHOSPHATES 278-250MG
1 POWDER IN PACKET (EA) ORAL ONCE
Refills: 0 | Status: COMPLETED | OUTPATIENT
Start: 2022-03-03 | End: 2022-03-03

## 2022-03-03 RX ADMIN — Medication 600 MILLIGRAM(S): at 05:16

## 2022-03-03 RX ADMIN — SIMETHICONE 80 MILLIGRAM(S): 80 TABLET, CHEWABLE ORAL at 11:32

## 2022-03-03 RX ADMIN — SIMETHICONE 80 MILLIGRAM(S): 80 TABLET, CHEWABLE ORAL at 23:09

## 2022-03-03 RX ADMIN — Medication 1 PACKET(S): at 11:33

## 2022-03-03 RX ADMIN — SODIUM CHLORIDE 3 MILLILITER(S): 9 INJECTION INTRAMUSCULAR; INTRAVENOUS; SUBCUTANEOUS at 21:55

## 2022-03-03 RX ADMIN — SODIUM CHLORIDE 3 MILLILITER(S): 9 INJECTION INTRAMUSCULAR; INTRAVENOUS; SUBCUTANEOUS at 07:27

## 2022-03-03 RX ADMIN — SIMETHICONE 80 MILLIGRAM(S): 80 TABLET, CHEWABLE ORAL at 17:17

## 2022-03-03 RX ADMIN — Medication 600 MILLIGRAM(S): at 17:17

## 2022-03-03 RX ADMIN — HEPARIN SODIUM 5000 UNIT(S): 5000 INJECTION INTRAVENOUS; SUBCUTANEOUS at 11:33

## 2022-03-03 RX ADMIN — Medication 600 MILLIGRAM(S): at 23:09

## 2022-03-03 RX ADMIN — Medication 975 MILLIGRAM(S): at 05:16

## 2022-03-03 RX ADMIN — Medication 975 MILLIGRAM(S): at 17:17

## 2022-03-03 RX ADMIN — SIMETHICONE 80 MILLIGRAM(S): 80 TABLET, CHEWABLE ORAL at 05:17

## 2022-03-03 RX ADMIN — Medication 600 MILLIGRAM(S): at 11:32

## 2022-03-03 RX ADMIN — PANTOPRAZOLE SODIUM 40 MILLIGRAM(S): 20 TABLET, DELAYED RELEASE ORAL at 11:33

## 2022-03-03 RX ADMIN — SODIUM CHLORIDE 3 MILLILITER(S): 9 INJECTION INTRAMUSCULAR; INTRAVENOUS; SUBCUTANEOUS at 13:54

## 2022-03-03 RX ADMIN — Medication 975 MILLIGRAM(S): at 11:33

## 2022-03-03 RX ADMIN — HEPARIN SODIUM 5000 UNIT(S): 5000 INJECTION INTRAVENOUS; SUBCUTANEOUS at 01:47

## 2022-03-03 RX ADMIN — HEPARIN SODIUM 5000 UNIT(S): 5000 INJECTION INTRAVENOUS; SUBCUTANEOUS at 19:03

## 2022-03-03 RX ADMIN — Medication 975 MILLIGRAM(S): at 21:30

## 2022-03-03 NOTE — PROGRESS NOTE ADULT - ASSESSMENT
Assessment/Plan: 51y female POD#2, s/p ex-lap, retroperitoneal dissection, resection of pelvic mass, R nephrectomy. Patient stable and progressing well postoperatively.    CV: Hemodynamically stable, f/u AM CBC.  Pulm: Saturating well on RA. Continue incentive spirometry.  GI: Regular diet as tolerated  : Voiding spontaneously. SLIV. Cr 1.14. f/u AM BMP/Mg/Phos. Replete lytes PRN.   Heme: Continue HSQ/Venodynes for DVT ppx. Increase OOB. Rx for Apixaban x28 days upon discharge provided.  Neuro: Continue oral meds for pain control.  Dispo: continue inpatient postoperative care    Brenda Barroso, PGY2 Assessment/Plan: 51y female POD#2, s/p ex-lap, retroperitoneal dissection, resection of pelvic mass, R nephrectomy. Patient stable and progressing well postoperatively.    CV: BPs slightly low overnight, however, patient asymptomatic and not tachycardic. f/u AM CBC.  Pulm: Saturating well on RA. Continue incentive spirometry.  GI: Regular diet as tolerated  : Voiding spontaneously. SLIV. Cr 1.14. f/u AM BMP/Mg/Phos. Replete lytes PRN.   Heme: Continue HSQ/Venodynes for DVT ppx. Continue OOB. Rx for Apixaban x28 days upon discharge provided.  Neuro: Continue oral meds for pain control.  Dispo: continue routine inpatient postoperative care    Brenda Barroso, PGY2 Assessment/Plan: 51y female POD#2, s/p ex-lap, retroperitoneal dissection, resection of pelvic mass, R nephrectomy. Patient stable and progressing well postoperatively.    CV: BPs slightly low overnight, however, patient asymptomatic and not tachycardic. f/u AM CBC.  Pulm: Saturating well on RA. Continue incentive spirometry.  GI: Regular diet as tolerated  : Voiding spontaneously, UOP adequate. SLIV. Cr 1.14. f/u AM BMP/Mg/Phos. Replete lytes PRN.   Heme: Continue HSQ/Venodynes for DVT ppx. Continue OOB. Rx for Apixaban x28 days upon discharge provided.  Neuro: Continue oral meds for pain control.  Dispo: continue routine inpatient postoperative care    Brenda Barroso, PGY2

## 2022-03-03 NOTE — PROGRESS NOTE ADULT - SUBJECTIVE AND OBJECTIVE BOX
Gyn ONC Progress Note     POD #2    Subjective:   Pt seen and examined at bedside. No events overnight. Pain well controlled. Patient ambulating. Passing flatus. Tolerating regular diet. Pt denies fever, chills, chest pain, SOB, nausea, vomiting, lightheadedness, dizziness.      Objective:  T(F): 98 (03-03-22 @ 01:45), Max: 98.5 (03-02-22 @ 17:16)  HR: 64 (03-03-22 @ 01:45) (64 - 72)  BP: 96/51 (03-03-22 @ 01:45) (93/48 - 108/62)  RR: 16 (03-03-22 @ 01:45) (16 - 18)  SpO2: 98% (03-03-22 @ 01:45) (98% - 100%)    I&O's Summary    01 Mar 2022 07:01  -  02 Mar 2022 07:00  --------------------------------------------------------  IN: 900 mL / OUT: 530 mL / NET: 370 mL    02 Mar 2022 07:01  -  03 Mar 2022 05:23  --------------------------------------------------------  IN: 600 mL / OUT: 2800 mL / NET: -2200 mL      CAPILLARY BLOOD GLUCOSE          MEDICATIONS  (STANDING):  acetaminophen     Tablet .. 975 milliGRAM(s) Oral every 6 hours  heparin   Injectable 5000 Unit(s) SubCutaneous every 8 hours  ibuprofen  Tablet. 600 milliGRAM(s) Oral every 6 hours  pantoprazole  Injectable 40 milliGRAM(s) IV Push daily  simethicone 80 milliGRAM(s) Chew every 6 hours  sodium chloride 0.9% lock flush 3 milliLiter(s) IV Push every 8 hours    MEDICATIONS  (PRN):  ondansetron Injectable 4 milliGRAM(s) IV Push every 6 hours PRN Nausea and/or Vomiting  oxyCODONE    IR 5 milliGRAM(s) Oral every 3 hours PRN Moderate & Severe Pain (4 - 10)  senna 2 Tablet(s) Oral at bedtime PRN Constipation      Physical Exam:  Constitutional: NAD, A+O x3  CV: RRR  Lungs: clear to auscultation bilaterally  Abdomen: soft, nondistended, no guarding, no rebound, +bowel sounds, abdominal binder in place  Incision: midline vertical incision clean, dry, intact w/ dermabond prineo dressing in place  Extremities: no lower extremity edema or calf tenderness bilaterally; venodynes in place bilaterally    LABS:  03-02    133<L>  |  101    |  15     ----------------------------<  194<H>  3.6     |  21<L>  |  1.14     Ca    7.8<L>      02 Mar 2022 05:54  Phos  4.5       03-02  Mg     1.60      03-02     Gyn ONC Progress Note     POD #2    Subjective:   Pt seen and examined at bedside. No events overnight. Pain well controlled. Patient ambulating. Passing flatus. Tolerating small amounts of regular diet. Pt denies fever, chills, chest pain, SOB, nausea, vomiting, lightheadedness, dizziness.      Objective:  T(F): 98 (03-03-22 @ 01:45), Max: 98.5 (03-02-22 @ 17:16)  HR: 64 (03-03-22 @ 01:45) (64 - 72)  BP: 96/51 (03-03-22 @ 01:45) (93/48 - 108/62)  RR: 16 (03-03-22 @ 01:45) (16 - 18)  SpO2: 98% (03-03-22 @ 01:45) (98% - 100%)    I&O's Summary    01 Mar 2022 07:01  -  02 Mar 2022 07:00  --------------------------------------------------------  IN: 900 mL / OUT: 530 mL / NET: 370 mL    02 Mar 2022 07:01  -  03 Mar 2022 05:23  --------------------------------------------------------  IN: 600 mL / OUT: 2800 mL / NET: -2200 mL      CAPILLARY BLOOD GLUCOSE          MEDICATIONS  (STANDING):  acetaminophen     Tablet .. 975 milliGRAM(s) Oral every 6 hours  heparin   Injectable 5000 Unit(s) SubCutaneous every 8 hours  ibuprofen  Tablet. 600 milliGRAM(s) Oral every 6 hours  pantoprazole  Injectable 40 milliGRAM(s) IV Push daily  simethicone 80 milliGRAM(s) Chew every 6 hours  sodium chloride 0.9% lock flush 3 milliLiter(s) IV Push every 8 hours    MEDICATIONS  (PRN):  ondansetron Injectable 4 milliGRAM(s) IV Push every 6 hours PRN Nausea and/or Vomiting  oxyCODONE    IR 5 milliGRAM(s) Oral every 3 hours PRN Moderate & Severe Pain (4 - 10)  senna 2 Tablet(s) Oral at bedtime PRN Constipation      Physical Exam:  Constitutional: NAD, A+O x3  CV: RRR  Lungs: clear to auscultation bilaterally  Abdomen: soft, nondistended, no guarding, no rebound, +bowel sounds, abdominal binder in place  Incision: midline vertical incision clean, dry, intact w/ dermabond prineo dressing in place  Extremities: no lower extremity edema or calf tenderness bilaterally; venodynes in place bilaterally    LABS:  03-02    133<L>  |  101    |  15     ----------------------------<  194<H>  3.6     |  21<L>  |  1.14     Ca    7.8<L>      02 Mar 2022 05:54  Phos  4.5       03-02  Mg     1.60      03-02     Gyn ONC Progress Note      ID: [id number]    POD #2    Subjective:   Pt seen and examined at bedside. No events overnight. Pain well controlled. Patient ambulating. Passing flatus. Tolerating small amounts of regular diet. Pt denies fever, chills, chest pain, SOB, nausea, vomiting, lightheadedness, dizziness.      Objective:  T(F): 98 (03-03-22 @ 01:45), Max: 98.5 (03-02-22 @ 17:16)  HR: 64 (03-03-22 @ 01:45) (64 - 72)  BP: 96/51 (03-03-22 @ 01:45) (93/48 - 108/62)  RR: 16 (03-03-22 @ 01:45) (16 - 18)  SpO2: 98% (03-03-22 @ 01:45) (98% - 100%)    I&O's Summary    01 Mar 2022 07:01  -  02 Mar 2022 07:00  --------------------------------------------------------  IN: 900 mL / OUT: 530 mL / NET: 370 mL    02 Mar 2022 07:01  -  03 Mar 2022 05:23  --------------------------------------------------------  IN: 600 mL / OUT: 2800 mL / NET: -2200 mL      CAPILLARY BLOOD GLUCOSE          MEDICATIONS  (STANDING):  acetaminophen     Tablet .. 975 milliGRAM(s) Oral every 6 hours  heparin   Injectable 5000 Unit(s) SubCutaneous every 8 hours  ibuprofen  Tablet. 600 milliGRAM(s) Oral every 6 hours  pantoprazole  Injectable 40 milliGRAM(s) IV Push daily  simethicone 80 milliGRAM(s) Chew every 6 hours  sodium chloride 0.9% lock flush 3 milliLiter(s) IV Push every 8 hours    MEDICATIONS  (PRN):  ondansetron Injectable 4 milliGRAM(s) IV Push every 6 hours PRN Nausea and/or Vomiting  oxyCODONE    IR 5 milliGRAM(s) Oral every 3 hours PRN Moderate & Severe Pain (4 - 10)  senna 2 Tablet(s) Oral at bedtime PRN Constipation      Physical Exam:  Constitutional: NAD, A+O x3  CV: RRR  Lungs: clear to auscultation bilaterally  Abdomen: soft, nondistended, no guarding, no rebound, +bowel sounds, abdominal binder in place  Incision: midline vertical incision clean, dry, intact w/ dermabond prineo dressing in place  Extremities: no lower extremity edema or calf tenderness bilaterally; venodynes in place bilaterally    LABS:  03-02    133<L>  |  101    |  15     ----------------------------<  194<H>  3.6     |  21<L>  |  1.14     Ca    7.8<L>      02 Mar 2022 05:54  Phos  4.5       03-02  Mg     1.60      03-02     Gyn ONC Progress Note      ID: [id number]    POD #2    Subjective:   Pt seen and examined at bedside. No events overnight. Pain well controlled. Patient ambulating. Tolerating small amounts of regular diet. Pt denies fever, chills, chest pain, SOB, nausea, vomiting, lightheadedness, dizziness.      Objective:  T(F): 98 (03-03-22 @ 01:45), Max: 98.5 (03-02-22 @ 17:16)  HR: 64 (03-03-22 @ 01:45) (64 - 72)  BP: 96/51 (03-03-22 @ 01:45) (93/48 - 108/62)  RR: 16 (03-03-22 @ 01:45) (16 - 18)  SpO2: 98% (03-03-22 @ 01:45) (98% - 100%)    I&O's Summary    01 Mar 2022 07:01  -  02 Mar 2022 07:00  --------------------------------------------------------  IN: 900 mL / OUT: 530 mL / NET: 370 mL    02 Mar 2022 07:01  -  03 Mar 2022 05:23  --------------------------------------------------------  IN: 600 mL / OUT: 2800 mL / NET: -2200 mL      CAPILLARY BLOOD GLUCOSE          MEDICATIONS  (STANDING):  acetaminophen     Tablet .. 975 milliGRAM(s) Oral every 6 hours  heparin   Injectable 5000 Unit(s) SubCutaneous every 8 hours  ibuprofen  Tablet. 600 milliGRAM(s) Oral every 6 hours  pantoprazole  Injectable 40 milliGRAM(s) IV Push daily  simethicone 80 milliGRAM(s) Chew every 6 hours  sodium chloride 0.9% lock flush 3 milliLiter(s) IV Push every 8 hours    MEDICATIONS  (PRN):  ondansetron Injectable 4 milliGRAM(s) IV Push every 6 hours PRN Nausea and/or Vomiting  oxyCODONE    IR 5 milliGRAM(s) Oral every 3 hours PRN Moderate & Severe Pain (4 - 10)  senna 2 Tablet(s) Oral at bedtime PRN Constipation      Physical Exam:  Constitutional: NAD, A+O x3  CV: RRR  Lungs: clear to auscultation bilaterally  Abdomen: soft, nondistended, no guarding, no rebound, +bowel sounds, abdominal binder in place  Incision: midline vertical incision clean, dry, intact w/ dermabond prineo dressing in place  Extremities: no lower extremity edema or calf tenderness bilaterally; venodynes in place bilaterally    LABS:  03-02    133<L>  |  101    |  15     ----------------------------<  194<H>  3.6     |  21<L>  |  1.14     Ca    7.8<L>      02 Mar 2022 05:54  Phos  4.5       03-02  Mg     1.60      03-02

## 2022-03-04 ENCOUNTER — TRANSCRIPTION ENCOUNTER (OUTPATIENT)
Age: 52
End: 2022-03-04

## 2022-03-04 VITALS
TEMPERATURE: 99 F | RESPIRATION RATE: 16 BRPM | SYSTOLIC BLOOD PRESSURE: 143 MMHG | HEART RATE: 65 BPM | DIASTOLIC BLOOD PRESSURE: 72 MMHG | OXYGEN SATURATION: 100 %

## 2022-03-04 LAB
ANION GAP SERPL CALC-SCNC: 6 MMOL/L — LOW (ref 7–14)
BASOPHILS # BLD AUTO: 0.02 K/UL — SIGNIFICANT CHANGE UP (ref 0–0.2)
BASOPHILS NFR BLD AUTO: 0.3 % — SIGNIFICANT CHANGE UP (ref 0–2)
BUN SERPL-MCNC: 10 MG/DL — SIGNIFICANT CHANGE UP (ref 7–23)
CALCIUM SERPL-MCNC: 7.6 MG/DL — LOW (ref 8.4–10.5)
CHLORIDE SERPL-SCNC: 108 MMOL/L — HIGH (ref 98–107)
CO2 SERPL-SCNC: 24 MMOL/L — SIGNIFICANT CHANGE UP (ref 22–31)
CREAT SERPL-MCNC: 1.12 MG/DL — SIGNIFICANT CHANGE UP (ref 0.5–1.3)
EGFR: 60 ML/MIN/1.73M2 — SIGNIFICANT CHANGE UP
EOSINOPHIL # BLD AUTO: 0.18 K/UL — SIGNIFICANT CHANGE UP (ref 0–0.5)
EOSINOPHIL NFR BLD AUTO: 2.8 % — SIGNIFICANT CHANGE UP (ref 0–6)
GLUCOSE SERPL-MCNC: 114 MG/DL — HIGH (ref 70–99)
HCT VFR BLD CALC: 30.7 % — LOW (ref 34.5–45)
HGB BLD-MCNC: 10.2 G/DL — LOW (ref 11.5–15.5)
IANC: 4.57 K/UL — SIGNIFICANT CHANGE UP (ref 1.5–8.5)
IMM GRANULOCYTES NFR BLD AUTO: 0.3 % — SIGNIFICANT CHANGE UP (ref 0–1.5)
LYMPHOCYTES # BLD AUTO: 1.27 K/UL — SIGNIFICANT CHANGE UP (ref 1–3.3)
LYMPHOCYTES # BLD AUTO: 19.6 % — SIGNIFICANT CHANGE UP (ref 13–44)
MAGNESIUM SERPL-MCNC: 1.9 MG/DL — SIGNIFICANT CHANGE UP (ref 1.6–2.6)
MCHC RBC-ENTMCNC: 31.3 PG — SIGNIFICANT CHANGE UP (ref 27–34)
MCHC RBC-ENTMCNC: 33.2 GM/DL — SIGNIFICANT CHANGE UP (ref 32–36)
MCV RBC AUTO: 94.2 FL — SIGNIFICANT CHANGE UP (ref 80–100)
MONOCYTES # BLD AUTO: 0.42 K/UL — SIGNIFICANT CHANGE UP (ref 0–0.9)
MONOCYTES NFR BLD AUTO: 6.5 % — SIGNIFICANT CHANGE UP (ref 2–14)
NEUTROPHILS # BLD AUTO: 4.57 K/UL — SIGNIFICANT CHANGE UP (ref 1.8–7.4)
NEUTROPHILS NFR BLD AUTO: 70.5 % — SIGNIFICANT CHANGE UP (ref 43–77)
NRBC # BLD: 0 /100 WBCS — SIGNIFICANT CHANGE UP
NRBC # FLD: 0 K/UL — SIGNIFICANT CHANGE UP
PHOSPHATE SERPL-MCNC: 3 MG/DL — SIGNIFICANT CHANGE UP (ref 2.5–4.5)
PLATELET # BLD AUTO: 213 K/UL — SIGNIFICANT CHANGE UP (ref 150–400)
POTASSIUM SERPL-MCNC: 4 MMOL/L — SIGNIFICANT CHANGE UP (ref 3.5–5.3)
POTASSIUM SERPL-SCNC: 4 MMOL/L — SIGNIFICANT CHANGE UP (ref 3.5–5.3)
RBC # BLD: 3.26 M/UL — LOW (ref 3.8–5.2)
RBC # FLD: 13 % — SIGNIFICANT CHANGE UP (ref 10.3–14.5)
SODIUM SERPL-SCNC: 138 MMOL/L — SIGNIFICANT CHANGE UP (ref 135–145)
WBC # BLD: 6.48 K/UL — SIGNIFICANT CHANGE UP (ref 3.8–10.5)
WBC # FLD AUTO: 6.48 K/UL — SIGNIFICANT CHANGE UP (ref 3.8–10.5)

## 2022-03-04 RX ORDER — OXYCODONE HYDROCHLORIDE 5 MG/1
1 TABLET ORAL
Qty: 8 | Refills: 0
Start: 2022-03-04

## 2022-03-04 RX ORDER — ACETAMINOPHEN 500 MG
3 TABLET ORAL
Qty: 0 | Refills: 0 | DISCHARGE
Start: 2022-03-04

## 2022-03-04 RX ORDER — SENNA PLUS 8.6 MG/1
2 TABLET ORAL DAILY
Refills: 0 | Status: DISCONTINUED | OUTPATIENT
Start: 2022-03-04 | End: 2022-03-04

## 2022-03-04 RX ORDER — MAGNESIUM OXIDE 400 MG ORAL TABLET 241.3 MG
400 TABLET ORAL ONCE
Refills: 0 | Status: COMPLETED | OUTPATIENT
Start: 2022-03-04 | End: 2022-03-04

## 2022-03-04 RX ADMIN — SIMETHICONE 80 MILLIGRAM(S): 80 TABLET, CHEWABLE ORAL at 14:08

## 2022-03-04 RX ADMIN — Medication 600 MILLIGRAM(S): at 14:08

## 2022-03-04 RX ADMIN — SENNA PLUS 2 TABLET(S): 8.6 TABLET ORAL at 11:10

## 2022-03-04 RX ADMIN — MAGNESIUM OXIDE 400 MG ORAL TABLET 400 MILLIGRAM(S): 241.3 TABLET ORAL at 07:05

## 2022-03-04 RX ADMIN — PANTOPRAZOLE SODIUM 40 MILLIGRAM(S): 20 TABLET, DELAYED RELEASE ORAL at 11:10

## 2022-03-04 RX ADMIN — HEPARIN SODIUM 5000 UNIT(S): 5000 INJECTION INTRAVENOUS; SUBCUTANEOUS at 04:39

## 2022-03-04 RX ADMIN — SODIUM CHLORIDE 3 MILLILITER(S): 9 INJECTION INTRAMUSCULAR; INTRAVENOUS; SUBCUTANEOUS at 14:19

## 2022-03-04 RX ADMIN — Medication 975 MILLIGRAM(S): at 17:56

## 2022-03-04 RX ADMIN — Medication 975 MILLIGRAM(S): at 04:38

## 2022-03-04 RX ADMIN — SODIUM CHLORIDE 3 MILLILITER(S): 9 INJECTION INTRAMUSCULAR; INTRAVENOUS; SUBCUTANEOUS at 05:00

## 2022-03-04 RX ADMIN — SIMETHICONE 80 MILLIGRAM(S): 80 TABLET, CHEWABLE ORAL at 07:04

## 2022-03-04 RX ADMIN — HEPARIN SODIUM 5000 UNIT(S): 5000 INJECTION INTRAVENOUS; SUBCUTANEOUS at 11:29

## 2022-03-04 RX ADMIN — Medication 975 MILLIGRAM(S): at 11:09

## 2022-03-04 RX ADMIN — Medication 600 MILLIGRAM(S): at 07:04

## 2022-03-04 NOTE — DISCHARGE NOTE NURSING/CASE MANAGEMENT/SOCIAL WORK - PATIENT PORTAL LINK FT
You can access the FollowMyHealth Patient Portal offered by [facility] by registering at the following website: [email or web address]. By joining [facility]’s FollowMyHealth portal, you will also be able to view your health information using other applications (apps) compatible with our system.

## 2022-03-04 NOTE — DISCHARGE NOTE NURSING/CASE MANAGEMENT/SOCIAL WORK - NSDCPEFALRISK_GEN_ALL_CORE
For information on Fall & Injury Prevention, visit: [email or web address] OR  [email or web address] OR  https://www.cdc.gov/steadi/patient.html

## 2022-03-04 NOTE — PROGRESS NOTE ADULT - ASSESSMENT
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 Patient stable and progressing well postoperatively.    CV: BPs improved, continue to monitor VS. f/u AM CBC.  Pulm: Saturating well on RA. Continue incentive spirometry.  GI: Regular diet as tolerated. Protonix for ppx. Senna, simethicone, zofran PRN.  : Voiding spontaneously, UOP adequate. SLIV. Cr improved from 1.26 to 1.12. f/u AM BMP/Mg/Phos. Replete lytes PRN.   Heme: Continue HSQ/Venodynes for DVT ppx. Continue OOB. Rx for Apixaban x28 days upon discharge provided.  Neuro: Continue oral meds for pain control.  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 Patient stable and progressing well postoperatively.    CV: BPs improved, continue to monitor VS. f/u AM CBC.  Pulm: Saturating well on RA. Continue incentive spirometry.  GI: Regular diet as tolerated. Protonix for ppx. Senna, simethicone, zofran PRN.  : Voiding spontaneously, UOP adequate. SLIV. Cr improved from 1.26 to 1.12. f/u AM BMP/Mg/Phos. Replete lytes PRN.   Heme: Continue HSQ/Venodynes for DVT ppx. Continue OOB. Rx for Apixaban x28 days upon discharge provided.  Neuro: Continue Tylenol, Motrin, and Oxy PRN for pain control  Dispo: continue routine inpatient postoperative care    Brenda Barroso, PGY2

## 2022-03-04 NOTE — PROGRESS NOTE ADULT - PROBLEM SELECTOR PLAN 1
GYN ONC Fellow Addendum:    Pt seen and examined at bedside. Agree with above. Pain controlled. Tolerating reg diet, -n/v. +flatus, -bm. ambulating and voiding.    VS reviewed  Labs reviewed    - Continue current pain regimen  - Reg diet  - Cr improved  - Encourage ambulation and IS use  - Replete lytes prn  - DVT ppx: lovenox, home with extended ppx eliquis  - OOB  - Dispo: eval for PM d/c    EDA Calles, PGY6
GYN ONC Fellow Addendum:    Pt seen and examined at bedside. Agree with above. Pain controlled. Tolerating reg diet, -n/v. -flatus. ambulating and voiding.    VS reviewed  CBC Pending    - Continue current pain regimen  - Reg diet, encourage po hydration, ARBF  - Encourage ambulation and IS use  - Replete lytes prn  - DVT ppx: HSQ  - OOB  - Dispo: continue inpatient management    EDA Calles, PGY6
GYN ONC Fellow Addendum:    Pt seen and examined at bedside. Agree with above. Pain controlled. Yet to attempt diet. -n/v. -flatus. yet to ambulate. Mcdermott in situ    VS reviewed  Labs reviewed    - Continue current pain regimen  - Clears, ADAT  - d/c mcdermott, monitor Cr  - Encourage ambulation and IS use  - Replete lytes prn  - DVT ppx: HSQ  - Discussed surgical findings and procedure  - Dispo: continue inpatient management    EDA Calles, PGY6

## 2022-03-04 NOTE — PROGRESS NOTE ADULT - SUBJECTIVE AND OBJECTIVE BOX
Gyn ONC Progress Note     POD #3    Subjective:   Pt seen and examined at bedside. No events overnight. Pain well controlled. Patient ambulating. Passing flatus and voiding spontaneously. Tolerating regular diet. Pt denies fever, chills, chest pain, SOB, nausea, vomiting, lightheadedness, dizziness.    Objective:  T(F): 97.9 (03-04-22 @ 04:44), Max: 98.1 (03-03-22 @ 09:50)  HR: 61 (03-04-22 @ 04:44) (57 - 66)  BP: 136/73 (03-04-22 @ 04:44) (110/63 - 136/73)  RR: 16 (03-04-22 @ 04:44) (16 - 18)  SpO2: 99% (03-04-22 @ 04:44) (98% - 100%)  Wt(kg): --  I&O's Summary    02 Mar 2022 07:01  -  03 Mar 2022 07:00  --------------------------------------------------------  IN: 600 mL / OUT: 3200 mL / NET: -2600 mL    03 Mar 2022 07:01  -  04 Mar 2022 06:34  --------------------------------------------------------  IN: 1380 mL / OUT: 2100 mL / NET: -720 mL      CAPILLARY BLOOD GLUCOSE          MEDICATIONS  (STANDING):  acetaminophen     Tablet .. 975 milliGRAM(s) Oral every 6 hours  heparin   Injectable 5000 Unit(s) SubCutaneous every 8 hours  ibuprofen  Tablet. 600 milliGRAM(s) Oral every 6 hours  pantoprazole  Injectable 40 milliGRAM(s) IV Push daily  simethicone 80 milliGRAM(s) Chew every 6 hours  sodium chloride 0.9% lock flush 3 milliLiter(s) IV Push every 8 hours    MEDICATIONS  (PRN):  ondansetron Injectable 4 milliGRAM(s) IV Push every 6 hours PRN Nausea and/or Vomiting  oxyCODONE    IR 5 milliGRAM(s) Oral every 3 hours PRN Moderate & Severe Pain (4 - 10)  senna 2 Tablet(s) Oral at bedtime PRN Constipation      Physical Exam:  Constitutional: NAD, A+O x3  CV: RRR  Lungs: clear to auscultation bilaterally  Abdomen: soft, nondistended, no guarding, no rebound, +bowel sounds  Incision: midline vertical incision clean, dry, intact w/ dermabond prineo dressing, abdominal binder in place  Extremities: no lower extremity edema or calf tenderness bilaterally; venodynes in place    LABS:  03-04    138    |  108<H>  |  10     ----------------------------<  114<H>  4.0     |  24     |  1.12   03-03    139    |  110<H>  |  14     ----------------------------<  129<H>  4.2     |  23     |  1.26     Ca    7.6<L>      04 Mar 2022 05:51  Ca    7.6<L>      03 Mar 2022 06:34  Phos  3.0       03-04  Phos  2.4       03-03  Mg     1.90      03-04  Mg     2.20      03-03

## 2022-03-04 NOTE — DISCHARGE NOTE NURSING/CASE MANAGEMENT/SOCIAL WORK - NSDCPECAREGIVERED_GEN_ALL_CORE
patient education regarding medications including side effects given to patient with printed hand out/Yes

## 2022-03-04 NOTE — PROGRESS NOTE ADULT - PROBLEM SELECTOR PROBLEM 1
Intra-abdominal and pelvic swelling, mass and lump, unspecified site

## 2022-03-04 NOTE — PROGRESS NOTE ADULT - ATTENDING COMMENTS
Patient seen and examined at bedside, agree with above note, including assessment and plan. Abdomen benign. Discussed today's plan of care with patient, all questions answered. Continue routine postop care, d/c planning.
Patient seen and examined, agree with gyn housestaff  POD#1  Doing well  Routine postop care  Labs stable  Exam benign  Discussed intraoperative findings
Patient seen and examined, agree with gyn housestaff  POD#2  Doing well  Routine postop care  Increase PO hydration, monitor Cr  Excellent UOP  Exam benign  Increase ambulation

## 2022-03-07 ENCOUNTER — NON-APPOINTMENT (OUTPATIENT)
Age: 52
End: 2022-03-07

## 2022-03-08 ENCOUNTER — NON-APPOINTMENT (OUTPATIENT)
Age: 52
End: 2022-03-08

## 2022-03-08 LAB — NON-GYNECOLOGICAL CYTOLOGY STUDY: SIGNIFICANT CHANGE UP

## 2022-03-10 LAB — SURGICAL PATHOLOGY STUDY: SIGNIFICANT CHANGE UP

## 2022-03-14 ENCOUNTER — APPOINTMENT (OUTPATIENT)
Dept: GYNECOLOGIC ONCOLOGY | Facility: CLINIC | Age: 52
End: 2022-03-14
Payer: MEDICARE

## 2022-03-14 VITALS
SYSTOLIC BLOOD PRESSURE: 116 MMHG | TEMPERATURE: 98.2 F | DIASTOLIC BLOOD PRESSURE: 74 MMHG | HEART RATE: 71 BPM | HEIGHT: 60 IN

## 2022-03-14 PROCEDURE — 99024 POSTOP FOLLOW-UP VISIT: CPT

## 2022-03-16 ENCOUNTER — NON-APPOINTMENT (OUTPATIENT)
Age: 52
End: 2022-03-16

## 2022-03-24 ENCOUNTER — APPOINTMENT (OUTPATIENT)
Dept: UROLOGY | Facility: CLINIC | Age: 52
End: 2022-03-24
Payer: MEDICARE

## 2022-03-24 ENCOUNTER — APPOINTMENT (OUTPATIENT)
Dept: SURGICAL ONCOLOGY | Facility: CLINIC | Age: 52
End: 2022-03-24
Payer: MEDICARE

## 2022-03-24 VITALS
RESPIRATION RATE: 17 BRPM | DIASTOLIC BLOOD PRESSURE: 77 MMHG | BODY MASS INDEX: 21.6 KG/M2 | WEIGHT: 110 LBS | HEIGHT: 60 IN | HEART RATE: 79 BPM | SYSTOLIC BLOOD PRESSURE: 113 MMHG | OXYGEN SATURATION: 99 %

## 2022-03-24 PROCEDURE — 99024 POSTOP FOLLOW-UP VISIT: CPT

## 2022-03-24 RX ORDER — APIXABAN 2.5 MG/1
2.5 TABLET, FILM COATED ORAL
Refills: 0 | Status: ACTIVE | COMMUNITY

## 2022-03-24 RX ORDER — DOXYCYCLINE HYCLATE 50 MG/1
CAPSULE ORAL
Refills: 0 | Status: DISCONTINUED | COMMUNITY
End: 2022-03-24

## 2022-03-24 RX ORDER — NAPROXEN 500 MG/1
TABLET ORAL
Refills: 0 | Status: DISCONTINUED | COMMUNITY
End: 2022-03-24

## 2022-03-24 NOTE — CONSULT LETTER
[Dear  ___] : Dear ~GUIDO, [Consult Letter:] : I had the pleasure of evaluating your patient, [unfilled]. [Please see my note below.] : Please see my note below. [Consult Closing:] : Thank you very much for allowing me to participate in the care of this patient.  If you have any questions, please do not hesitate to contact me. [Sincerely,] : Sincerely, [FreeTextEntry2] : Almaz De Jesus MD [FreeTextEntry3] : Devante Telles MD\par Surgical Oncology\par [facility]\par Office: [phone]\par Cell: [phone]\par  [DrBritney  ___] : Dr. LAWSON

## 2022-03-24 NOTE — PHYSICAL EXAM
[de-identified] : Incision is healing well.  There is resolving ecchymosis of the anterior abdominal wall.

## 2022-03-24 NOTE — HISTORY OF PRESENT ILLNESS
[de-identified] : Tiffani is a pleasant 51 year-old female here for an initial postop visit. s/p Exploratory laparotomy, intraoperative consultation, resection of retroperitoneal tumor including right nephrectomy on 3/1/22. 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 Peritoneal wash negative for malignant cells.\par \par On 2/10/22 she underwent a transvaginal US which revealed a large heterogenous mass measuring up to 27.7 cm noted in the abdomen and pelvis. CT or MRI recommended. \par \par CT A/P on 2/17/22 revealed 29.5 cm heterogenous predominately cystic mass with irregular soft tissue components extending from the mid to upper pelvis into the right upper quadrant of the abdomen. this is highly suspicious for a large cystic gynecological neoplasm (Although it does not clearly arise from the right ovary which appears to be located below the mass; the left ovary is not well visualized). An unusual markedly degenerated uterine leiomyoma is also possible but considered less likely. an Atypical mesenchymal tumor is also possible but also considered less likely. Secondary mild to moderate right hydronephrosis and small ascites. Leiomyomatous  uterus. Small hepatic hemangioma. Small left renal cyst. \par \par According to Dr De Jesus's note pt had presented with irregular menses, pelvic pressure and abdominal distention which lead to the transvaginal US. \par \par 2/18/22 : 36 (WNL), CA 19-9: 16 (WNL), CEA 1.9 (WNL). \par \par INTERVAL HISTORY:\par \par She is s/p Exploratory laparotomy, intraoperative consultation, resection of retroperitoneal tumor including right nephrectomy on 3/1/22. 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 Peritoneal wash negative for malignant cells.\par \par 3/24/22- She presents today for a postop visit.  She is accompanied by her son.  The visit is facilitated with [employer]  translators of Mandarin and Cantonese.  She describes postoperative discomfort with movement but otherwise denies any significant complaints.  She is tolerating a diet.  She is taking Eliquis for DVT prophylaxis.

## 2022-03-24 NOTE — REASON FOR VISIT
[Post-Op] : a post-op for [FreeTextEntry2] : s/p Exploratory laparotomy, intraoperative consultation, resection of retroperitoneal tumor including right nephrectomy on 3/1/22.

## 2022-03-24 NOTE — ASSESSMENT
[FreeTextEntry1] : We discussed the need for radiation oncology evaluation.  I am also arranging for a CT of the chest, abdomen, and pelvis to establish a baseline.  Ms. Daniels will follow up with me in 2 months.

## 2022-03-25 RX ORDER — OXYCODONE 5 MG/1
5 TABLET ORAL
Qty: 8 | Refills: 0 | Status: ACTIVE | COMMUNITY
Start: 2022-03-04

## 2022-03-25 RX ORDER — CALCIUM CARBONATE/VITAMIN D3 500MG-5MCG
500-5 TABLET ORAL
Qty: 60 | Refills: 0 | Status: ACTIVE | COMMUNITY
Start: 2022-02-26

## 2022-03-25 RX ORDER — ADHESIVE TAPE 3"X 2.3 YD
50 MCG TAPE, NON-MEDICATED TOPICAL
Qty: 30 | Refills: 0 | Status: ACTIVE | COMMUNITY
Start: 2022-02-19

## 2022-03-28 NOTE — ASSESSMENT
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 \par --Stop Naproxen. Switch to Tylenol for pain due to potential renal toxicity\par --Refer to Nephrology Dr Jeanette Monet Info given  \par --Follow up PRN.

## 2022-03-28 NOTE — PHYSICAL EXAM
[General Appearance - Well Developed] : well developed [General Appearance - Well Nourished] : well nourished [Normal Appearance] : normal appearance [Well Groomed] : well groomed [General Appearance - In No Acute Distress] : no acute distress [Abdomen Soft] : soft [Abdomen Tenderness] : non-tender [Costovertebral Angle Tenderness] : no ~M costovertebral angle tenderness [Urinary Bladder Findings] : the bladder was normal on palpation [Edema] : no peripheral edema [] : no respiratory distress [Respiration, Rhythm And Depth] : normal respiratory rhythm and effort [Exaggerated Use Of Accessory Muscles For Inspiration] : no accessory muscle use [Oriented To Time, Place, And Person] : oriented to person, place, and time [Affect] : the affect was normal [Mood] : the mood was normal [Not Anxious] : not anxious [Normal Station and Gait] : the gait and station were normal for the patient's age [FreeTextEntry1] : healed mid abdominal incision

## 2022-03-28 NOTE — HISTORY OF PRESENT ILLNESS
[FreeTextEntry1] : 52yo female with cc of R RP mass. Pt initially seen as be by Dr De Jesus for RP mass possibly of GYN origin. Was called for intraop consultation as there was concern for renal involvement. Seemed to be emanating from gerotas and given suspicion of sarcoma, decision was made for en bloc resection. Path returned as G2 well differentiated liposarcoma with microfocus of dedifferentiated type. Margins were negative. \par \par She returns today for follow-up.  doing well post op.Her son Mario did all the translation .Voids  5 times day time and nocturia X 1, with no UI. Fluids : 16.9  x 2- 3 .Denies constipation. pt is on Eliquis ,unsure of indication if for DVT prophylaxis .Pt takes naproxen for pain. \par

## 2022-04-12 ENCOUNTER — APPOINTMENT (OUTPATIENT)
Dept: RADIATION ONCOLOGY | Facility: CLINIC | Age: 52
End: 2022-04-12
Payer: MEDICAID

## 2022-04-12 VITALS
BODY MASS INDEX: 21.81 KG/M2 | HEART RATE: 67 BPM | OXYGEN SATURATION: 100 % | WEIGHT: 111.09 LBS | SYSTOLIC BLOOD PRESSURE: 126 MMHG | TEMPERATURE: 36.1 F | HEIGHT: 60 IN | DIASTOLIC BLOOD PRESSURE: 83 MMHG | RESPIRATION RATE: 18 BRPM

## 2022-04-12 DIAGNOSIS — C49.9 MALIGNANT NEOPLASM OF CONNECTIVE AND SOFT TISSUE, UNSPECIFIED: ICD-10-CM

## 2022-04-12 DIAGNOSIS — Z78.9 OTHER SPECIFIED HEALTH STATUS: ICD-10-CM

## 2022-04-12 PROCEDURE — 99204 OFFICE O/P NEW MOD 45 MIN: CPT | Mod: 25

## 2022-04-14 NOTE — REASON FOR VISIT
[Consideration of Curative Therapy] : consideration of curative therapy for [Other: ___] : [unfilled] [[employer] ] : provided by [employer]   [Interpreters_IDNumber] : [id number] [Interpreters_FullName] : Fadia [TWNoteComboBox1] : Harsh

## 2022-04-14 NOTE — PHYSICAL EXAM
[General Appearance - Well Developed] : well developed [General Appearance - Well Nourished] : well nourished [Thin] : thin [Sclera] : the sclera and conjunctiva were normal [Extraocular Movements] : extraocular movements were intact [Outer Ear] : the ears and nose were normal in appearance [Hearing Threshold Finger Rub Not [place]] : hearing was normal [Examination Of The Oral Cavity] : the lips and gums were normal [Exaggerated Use Of Accessory Muscles For Inspiration] : no accessory muscle use [Edema] : no peripheral edema present [Abdomen Soft] : soft [Nondistended] : nondistended [Cervical Lymph Nodes Enlarged Posterior Bilaterally] : posterior cervical [Cervical Lymph Nodes Enlarged Anterior Bilaterally] : anterior cervical [Supraclavicular Lymph Nodes Enlarged Bilaterally] : supraclavicular [Range of Motion to Joints] : range of motion to joints [Nail Clubbing] : no clubbing  or cyanosis of the fingernails [Motor Tone] : muscle strength and tone were normal [Skin Color & Pigmentation] : normal skin color and pigmentation [] : no rash [Normal] : oriented to person, place and time, the affect was normal, the mood was normal and not anxious [de-identified] : large midline incision, CDI, nonerythematous, no discharge

## 2022-04-14 NOTE — HISTORY OF PRESENT ILLNESS
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 Negative peritoneal wash. LVI negative, tumor intact and encapsulated, no necrosis, no LNs. [id number]. 1mm from perinephric fat margin, 5cm from ureteral margin. \par \par On 2/10/22 she underwent a transvaginal US which revealed a large heterogenous mass measuring up to 27.7 cm noted in the abdomen and pelvis. CT or MRI recommended. \par \par CT A/P on 2/17/22 revealed 29.5 cm heterogenous predominately cystic mass with irregular soft tissue components extending from the mid to upper pelvis into the right upper quadrant of the abdomen. this is highly suspicious for a large cystic gynecological neoplasm (Although it does not clearly arise from the right ovary which appears to be located below the mass; the left ovary is not well visualized). An unusual markedly degenerated uterine leiomyoma is also possible but considered less likely. an Atypical mesenchymal tumor is also possible but also considered less likely. Secondary mild to moderate right hydronephrosis and small ascites. Leiomyomatous uterus. Small hepatic hemangioma. Small left renal cyst. \par \par According to Dr De Jesus's note pt had presented with irregular menses, pelvic pressure and abdominal distention which lead to the transvaginal US. \par \par 2/18/22 : 36 (WNL), CA 19-9: 16 (WNL), CEA 1.9 (WNL). \par \par INTERVAL HISTORY:\par \par She is s/p Exploratory laparotomy, intraoperative consultation, resection of retroperitoneal tumor including right nephrectomy on 3/1/22. 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 Peritoneal wash negative for malignant cells.\par \par 3/24/22 - saw Dr Telles for postop visit. CT CAP ordered.

## 2022-04-14 NOTE — REVIEW OF SYSTEMS
[Skin Rash] : skin rash [Skin Wound] : skin wound [Negative] : Allergic/Immunologic [de-identified] : Surgical

## 2022-04-30 ENCOUNTER — RESULT REVIEW (OUTPATIENT)
Age: 52
End: 2022-04-30

## 2022-05-13 ENCOUNTER — APPOINTMENT (OUTPATIENT)
Dept: CT IMAGING | Facility: IMAGING CENTER | Age: 52
End: 2022-05-13

## 2022-05-19 ENCOUNTER — APPOINTMENT (OUTPATIENT)
Dept: SURGICAL ONCOLOGY | Facility: CLINIC | Age: 52
End: 2022-05-19

## 2022-06-02 ENCOUNTER — APPOINTMENT (OUTPATIENT)
Dept: SURGICAL ONCOLOGY | Facility: CLINIC | Age: 52
End: 2022-06-02

## 2022-07-25 ENCOUNTER — APPOINTMENT (OUTPATIENT)
Dept: CT IMAGING | Facility: IMAGING CENTER | Age: 52
End: 2022-07-25

## 2022-07-25 ENCOUNTER — OUTPATIENT (OUTPATIENT)
Dept: OUTPATIENT SERVICES | Facility: HOSPITAL | Age: 52
LOS: 1 days | End: 2022-07-25
Payer: MEDICARE

## 2022-07-25 DIAGNOSIS — Z98.890 OTHER SPECIFIED POSTPROCEDURAL STATES: Chronic | ICD-10-CM

## 2022-07-25 DIAGNOSIS — C49.9 MALIGNANT NEOPLASM OF CONNECTIVE AND SOFT TISSUE, UNSPECIFIED: ICD-10-CM

## 2022-07-25 PROCEDURE — 74177 CT ABD & PELVIS W/CONTRAST: CPT | Mod: 26

## 2022-07-25 PROCEDURE — 74177 CT ABD & PELVIS W/CONTRAST: CPT

## 2022-08-01 PROBLEM — C49.9 SARCOMA: Status: ACTIVE | Noted: 2022-03-24

## 2022-08-04 ENCOUNTER — APPOINTMENT (OUTPATIENT)
Dept: SURGICAL ONCOLOGY | Facility: CLINIC | Age: 52
End: 2022-08-04

## 2022-08-04 VITALS
DIASTOLIC BLOOD PRESSURE: 85 MMHG | HEIGHT: 60 IN | RESPIRATION RATE: 16 BRPM | BODY MASS INDEX: 22.97 KG/M2 | SYSTOLIC BLOOD PRESSURE: 132 MMHG | WEIGHT: 117 LBS | TEMPERATURE: 98.8 F | OXYGEN SATURATION: 98 % | HEART RATE: 87 BPM

## 2022-08-04 DIAGNOSIS — C49.9 MALIGNANT NEOPLASM OF CONNECTIVE AND SOFT TISSUE, UNSPECIFIED: ICD-10-CM

## 2022-08-04 PROCEDURE — 99214 OFFICE O/P EST MOD 30 MIN: CPT

## 2022-08-09 NOTE — ASSESSMENT
[FreeTextEntry1] : We again discussed the need for radiation oncology follow-up.  I have contacted Dr. Morejon to expedite a follow-up appointment.  She will also undergo an abdominal MRI to further evaluate the CT findings.

## 2022-08-09 NOTE — PHYSICAL EXAM
[Normal] : supple, no neck mass and thyroid not enlarged [Normal Neck Lymph Nodes] : normal neck lymph nodes  [Normal Supraclavicular Lymph Nodes] : normal supraclavicular lymph nodes [Normal Groin Lymph Nodes] : normal groin lymph nodes [Normal Axillary Lymph Nodes] : normal axillary lymph nodes [Normal] : not distended, non tender, no masses, no hepatosplenomegaly [de-identified] : Midline incision is well-healed. [de-identified] : incision well healed

## 2022-08-09 NOTE — HISTORY OF PRESENT ILLNESS
[de-identified] : Tiffani is a pleasant 51 year-old female who presents today for a follow-up visit. \par \par I initially saw Tiffani as an intraoperative consult in March of 2022. \par \par On 2/10/22 she underwent a transvaginal US which revealed a large heterogenous mass measuring up to 27.7 cm noted in the abdomen and pelvis. CT or MRI recommended. \par CT A/P on 2/17/22 revealed 29.5 cm heterogenous predominately cystic mass with irregular soft tissue components extending from the mid to upper pelvis into the right upper quadrant of the abdomen. this is highly suspicious for a large cystic gynecological neoplasm (Although it does not clearly arise from the right ovary which appears to be located below the mass; the left ovary is not well visualized). An unusual markedly degenerated uterine leiomyoma is also possible but considered less likely. an Atypical mesenchymal tumor is also possible but also considered less likely. Secondary mild to moderate right hydronephrosis and small ascites. Leiomyomatous  uterus. Small hepatic hemangioma. Small left renal cyst. \par \par According to Dr De Jesus's note pt had presented with irregular menses, pelvic pressure and abdominal distention which lead to the transvaginal US. \par \par 2/18/22 : 36 (WNL), CA 19-9: 16 (WNL), CEA 1.9 (WNL). \par \par She is s/p Exploratory laparotomy, intraoperative consultation, resection of retroperitoneal tumor including right nephrectomy on 3/1/22. 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 Peritoneal wash negative for malignant cells.\par \par 3/24/22- She presents for a postop visit. She is accompanied by her son. The visit is facilitated with [employer]  translators of Mandarin and Cantonese. She describes postoperative discomfort with movement but otherwise denies any significant complaints. She is tolerating a diet. She is taking Eliquis for DVT prophylaxis. We discussed the need for her to establish care with a radiation oncologist for evaluation. She will under undergo a CT C/A/P to establish baseline imaging. \par \par CT A/P from 7/25/2022 shows status post right nephrectomy with linear changes in the nephrectomy bed likely postsurgical in nature. Heterogeneous soft tissue nodular density in the right upper quadrant between the medial right hepatic lobe and right colon measuring 1.6 x 1.6 cm indeterminate in nature, recommend comparison to preoperative images and attention on follow-up studies.\par \par 8/4/2022: Patient returns the office today accompanied by her son who facilities in translating.  She underwent recent CT scan.  We discussed results.  She has not followed up with radiation oncology thus far.  She has been tolerating a diet and denies any additional changes to her health.\par

## 2022-08-09 NOTE — CONSULT LETTER
[Courtesy Letter:] : I had the pleasure of seeing your patient, [unfilled], in my office today. [DrBritney ___] : Dr. LAWSON [FreeTextEntry2] : Almaz De Jesus MD [FreeTextEntry3] : Devante Telles MD\par Surgical Oncology\par [facility]\par Office: [phone]\par Cell: [phone]\par

## 2022-08-12 LAB — CHROM ANALY INTERPHASE BLD FISH-IMP: SIGNIFICANT CHANGE UP

## 2022-08-20 ENCOUNTER — OUTPATIENT (OUTPATIENT)
Dept: OUTPATIENT SERVICES | Facility: HOSPITAL | Age: 52
LOS: 1 days | End: 2022-08-20
Payer: MEDICARE

## 2022-08-20 ENCOUNTER — APPOINTMENT (OUTPATIENT)
Dept: MRI IMAGING | Facility: CLINIC | Age: 52
End: 2022-08-20

## 2022-08-20 DIAGNOSIS — C49.9 MALIGNANT NEOPLASM OF CONNECTIVE AND SOFT TISSUE, UNSPECIFIED: ICD-10-CM

## 2022-08-20 DIAGNOSIS — Z98.890 OTHER SPECIFIED POSTPROCEDURAL STATES: Chronic | ICD-10-CM

## 2022-08-20 DIAGNOSIS — Z00.8 ENCOUNTER FOR OTHER GENERAL EXAMINATION: ICD-10-CM

## 2022-08-20 PROCEDURE — 74183 MRI ABD W/O CNTR FLWD CNTR: CPT | Mod: 26

## 2022-08-20 PROCEDURE — A9585: CPT

## 2022-08-20 PROCEDURE — 74183 MRI ABD W/O CNTR FLWD CNTR: CPT

## 2022-08-24 ENCOUNTER — NON-APPOINTMENT (OUTPATIENT)
Age: 52
End: 2022-08-24

## 2023-04-17 NOTE — REASON FOR VISIT
[FreeTextEntry2] : s/p Exploratory laparotomy, intraoperative consultation, resection of retroperitoneal tumor including right nephrectomy on 3/1/22.  Benzoyl Peroxide Pregnancy And Lactation Text: This medication is Pregnancy Category C. It is unknown if benzoyl peroxide is excreted in breast milk.
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
## (undated) DEVICE — ABDOMINAL BINDER MED/LG 12" X 36"-54"

## (undated) DEVICE — Device

## (undated) DEVICE — DRSG MEPILEX 10 X 20CM (4 X 8") WHITE

## (undated) DEVICE — TUBING IRR SET FOR CYSTOSCOPY 77"

## (undated) DEVICE — SOL IRR POUR H2O 500ML

## (undated) DEVICE — PREP BETADINE SPONGE STICKS

## (undated) DEVICE — ELCTR EXTENSION STRAIGHT

## (undated) DEVICE — DRSG MEDIPORE + PAD 3.5X10"

## (undated) DEVICE — DRAPE LAPAROTOMY W VELCRO CORD TABS

## (undated) DEVICE — SOL IRR POUR H2O 1500ML

## (undated) DEVICE — SPONGE DISSECTOR PEANUT

## (undated) DEVICE — PACK PERI GYN

## (undated) DEVICE — POSITIONER STRAP ARMBOARD VELCRO TS-30

## (undated) DEVICE — STAPLER COVIDIEN ENDO GIA SHORT HANDLE

## (undated) DEVICE — SOL IRR POUR NS 0.9% 1500ML

## (undated) DEVICE — LIGASURE IMPACT

## (undated) DEVICE — ELCTR GROUNDING PAD ADULT COVIDIEN

## (undated) DEVICE — SUT VICRYL 0 36" CT-1 UNDYED

## (undated) DEVICE — TRAP SPECIMEN SPUTUM 40CC

## (undated) DEVICE — SUT VICRYL 0 18" TIES UNDYED

## (undated) DEVICE — SUT VICRYL 2-0 27" CT-1 UNDYED

## (undated) DEVICE — GLV 6 PROTEXIS (WHITE)

## (undated) DEVICE — SPONGE X-RAY 4X8"

## (undated) DEVICE — ELCTR BOVIE TIP BLADE INSULATED 6.5" EDGE

## (undated) DEVICE — PACK MAJOR ABDOMINAL WITH LAP

## (undated) DEVICE — GLV 5.5 PROTEXIS (CREAM) MICRO

## (undated) DEVICE — SUT QUILL MONODERM 3-0 30CM PS-2

## (undated) DEVICE — VENODYNE/SCD SLEEVE CALF MEDIUM

## (undated) DEVICE — FOLEY TRAY 16FR 5CC LF UMETER CLOSED

## (undated) DEVICE — DRSG TELFA 3 X 8

## (undated) DEVICE — LABELS BLANK W PEN

## (undated) DEVICE — STAPLER SKIN VISI-STAT 35 WIDE

## (undated) DEVICE — STAPLER SKIN MULTI DIRECTION W35

## (undated) DEVICE — SUT PDS II 1 48" TP-1

## (undated) DEVICE — CANISTER DISPOSABLE THIN WALL 3000CC

## (undated) DEVICE — DRAPE FLUID WARMER 44 X 44"